# Patient Record
Sex: FEMALE | Race: BLACK OR AFRICAN AMERICAN | NOT HISPANIC OR LATINO | Employment: FULL TIME | URBAN - METROPOLITAN AREA
[De-identification: names, ages, dates, MRNs, and addresses within clinical notes are randomized per-mention and may not be internally consistent; named-entity substitution may affect disease eponyms.]

---

## 2017-01-24 ENCOUNTER — NON-PROVIDER VISIT (OUTPATIENT)
Dept: MEDICAL GROUP | Facility: MEDICAL CENTER | Age: 22
End: 2017-01-24
Payer: COMMERCIAL

## 2017-01-24 DIAGNOSIS — Z30.09 FAMILY PLANNING: ICD-10-CM

## 2017-01-24 PROCEDURE — 96372 THER/PROPH/DIAG INJ SC/IM: CPT | Performed by: NURSE PRACTITIONER

## 2017-01-24 RX ORDER — MEDROXYPROGESTERONE ACETATE 150 MG/ML
150 INJECTION, SUSPENSION INTRAMUSCULAR ONCE
Status: COMPLETED | OUTPATIENT
Start: 2017-01-24 | End: 2017-01-24

## 2017-01-24 RX ADMIN — MEDROXYPROGESTERONE ACETATE 150 MG: 150 INJECTION, SUSPENSION INTRAMUSCULAR at 14:24

## 2017-01-24 NOTE — MR AVS SNAPSHOT
Berenice Holden   2017 1:45 PM   Non-Provider Visit   MRN: [id number]    Department:  [facility]   Dept Phone:  [phone]    Description:  Female : 1995   Provider:  [facility]           Reason for Visit     Injections depo provera      Allergies as of 2017     No Known Allergies      You were diagnosed with     Family planning   [411051]         Vital Signs     Smoking Status                   Never Smoker            Basic Information     Date Of Birth Sex Race Ethnicity Preferred Language    1995 Female Black or  Non- English      Problem List              ICD-10-CM Priority Class Noted - Resolved    Family planning Z30.09   2016 - Present      Health Maintenance        Date Due Completion Dates    IMM VARICELLA (CHICKENPOX) VACCINE (1 of 2 - 2 Dose Adolescent Series) 2008 ---    IMM MENINGOCOCCAL VACCINE (MCV4) (1 of 1) 2011 ---    IMM HPV VACCINE (2 of 3 - Female 3 Dose Series) 3/30/2016 2/3/2016    IMM INFLUENZA (1) 2016 ---    PAP SMEAR 2016 ---    IMM DTaP/Tdap/Td Vaccine (2 - Td) 2018            Current Immunizations     HPV Quadrivalent Vaccine (GARDASIL) 2/3/2016  4:57 PM    Hepatitis A Vaccine, Ped/Adol 2006, 2001    Hepatitis B Vaccine Non-Recombivax (Ped/Adol) 2003, 2001, 2000, 2000    Tdap Vaccine 2008      Below and/or attached are the medications your provider expects you to take. Review all of your home medications and newly ordered medications with your provider and/or pharmacist. Follow medication instructions as directed by your provider and/or pharmacist. Please keep your medication list with you and share with your provider. Update the information when medications are discontinued, doses are changed, or new medications (including over-the-counter products) are added; and carry medication information at all times in the event of emergency situations     Allergies:  No Known Allergies          Medications  Valid as of: January 24, 2017 -  2:31 PM    Generic Name Brand Name Tablet Size Instructions for use    Norgestim-Eth Estrad Triphasic (Tab) Norgestim-Eth Estrad Triphasic 0.18/0.215/0.25 MG-35 MCG Take 1 Tab by mouth every day.        .                 Medicines prescribed today were sent to:     [facility][id number] - [place], [place] - [address]    [address] [place] [address]    Phone: [phone] Fax: [phone]    Open 24 Hours?: No      Medication refill instructions:       If your prescription bottle indicates you have medication refills left, it is not necessary to call your provider’s office. Please contact your pharmacy and they will refill your medication.    If your prescription bottle indicates you do not have any refills left, you may request refills at any time through one of the following ways: The online VetCloud system (except Urgent Care), by calling your provider’s office, or by asking your pharmacy to contact your provider’s office with a refill request. Medication refills are processed only during regular business hours and may not be available until the next business day. Your provider may request additional information or to have a follow-up visit with you prior to refilling your medication.   *Please Note: Medication refills are assigned a new Rx number when refilled electronically. Your pharmacy may indicate that no refills were authorized even though a new prescription for the same medication is available at the pharmacy. Please request the medicine by name with the pharmacy before contacting your provider for a refill.           VetCloud Access Code: [email or web address]  Expires: 2/23/2017  1:44 PM    VetCloud  A secure, online tool to manage your health information     [employer]’s VetCloud® is a secure, online tool that connects you to your personalized health information from the privacy of your home -- day or night - making it  very easy for you to manage your healthcare. Once the activation process is completed, you can even access your medical information using the DreamBox Learning josephine, which is available for free in the Apple Josephine store or Google Play store.     DreamBox Learning provides the following levels of access (as shown below):   My Chart Features   [facility] Doctor [facility]  Specialists [facility]  Urgent  Care Non-[facility]  Primary Care  Doctor   Email your healthcare team securely and privately 24/7 X X X    Manage appointments: schedule your next appointment; view details of past/upcoming appointments X      Request prescription refills. X      View recent personal medical records, including lab and immunizations X X X X   View health record, including health history, allergies, medications X X X X   Read reports about your outpatient visits, procedures, consult and ER notes X X X X   See your discharge summary, which is a recap of your hospital and/or ER visit that includes your diagnosis, lab results, and care plan. X X       How to register for DreamBox Learning:  1. Go to  [email or web address].  2. Click on the Sign Up Now box, which takes you to the New Member Sign Up page. You will need to provide the following information:  a. Enter your DreamBox Learning Access Code exactly as it appears at the top of this page. (You will not need to use this code after you’ve completed the sign-up process. If you do not sign up before the expiration date, you must request a new code.)   b. Enter your date of birth.   c. Enter your home email address.   d. Click Submit, and follow the next screen’s instructions.  3. Create a DreamBox Learning ID. This will be your DreamBox Learning login ID and cannot be changed, so think of one that is secure and easy to remember.  4. Create a DreamBox Learning password. You can change your password at any time.  5. Enter your Password Reset Question and Answer. This can be used at a later time if you forget your password.   6. Enter your e-mail address. This  allows you to receive e-mail notifications when new information is available in Genalyte.  7. Click Sign Up. You can now view your health information.    For assistance activating your Genalyte account, call [phone]

## 2017-01-24 NOTE — PROGRESS NOTES
Berenice Holden is a 21 y.o. here for a Depo Provera Injection.     Date of last Depo Provera Injection: 10-  Current date within therapeutic range?: Yes   Urine pregnancy test done (needed if out of date range): yes--negitive  Date of office visit:9-  Date of last pap (if > 21 years old)/ GYN exam: none on file  Dx: Contraceptive use    Patient tolerated injection and no adverse effects were observed or reported yes.    # of Administrations remaining in MAR:0  Next injection due between 4- and 4-.

## 2017-03-03 ENCOUNTER — OFFICE VISIT (OUTPATIENT)
Dept: URGENT CARE | Facility: CLINIC | Age: 22
End: 2017-03-03
Payer: COMMERCIAL

## 2017-03-03 VITALS
RESPIRATION RATE: 18 BRPM | DIASTOLIC BLOOD PRESSURE: 62 MMHG | WEIGHT: 118 LBS | BODY MASS INDEX: 21.58 KG/M2 | SYSTOLIC BLOOD PRESSURE: 106 MMHG | TEMPERATURE: 99.9 F | OXYGEN SATURATION: 97 % | HEART RATE: 112 BPM

## 2017-03-03 DIAGNOSIS — J02.0 STREP PHARYNGITIS: ICD-10-CM

## 2017-03-03 DIAGNOSIS — J02.9 SORE THROAT: ICD-10-CM

## 2017-03-03 LAB
INT CON NEG: NEGATIVE
INT CON POS: POSITIVE
S PYO AG THROAT QL: NORMAL

## 2017-03-03 PROCEDURE — 87880 STREP A ASSAY W/OPTIC: CPT | Performed by: PHYSICIAN ASSISTANT

## 2017-03-03 PROCEDURE — 99214 OFFICE O/P EST MOD 30 MIN: CPT | Performed by: PHYSICIAN ASSISTANT

## 2017-03-03 RX ORDER — AMOXICILLIN 875 MG/1
875 TABLET, COATED ORAL 2 TIMES DAILY
Qty: 20 TAB | Refills: 0 | Status: SHIPPED | OUTPATIENT
Start: 2017-03-03 | End: 2017-05-03

## 2017-03-03 ASSESSMENT — ENCOUNTER SYMPTOMS
HEADACHES: 0
DIARRHEA: 0
PALPITATIONS: 0
COUGH: 0
VOMITING: 0
NAUSEA: 0
WHEEZING: 0
FEVER: 1
SHORTNESS OF BREATH: 0
MYALGIAS: 1
CHILLS: 1
SWOLLEN GLANDS: 1
ABDOMINAL PAIN: 0
TROUBLE SWALLOWING: 1
SORE THROAT: 1

## 2017-03-03 NOTE — PROGRESS NOTES
Subjective:      Berenice Holden is a 21 y.o. female who presents with Pharyngitis            Pharyngitis   This is a new problem. The current episode started in the past 7 days. The problem has been gradually worsening. The pain is worse on the left side. The maximum temperature recorded prior to her arrival was 101 - 101.9 F. The fever has been present for 1 to 2 days. The pain is at a severity of 6/10. The pain is moderate. Associated symptoms include swollen glands and trouble swallowing. Pertinent negatives include no abdominal pain, congestion, coughing, diarrhea, ear pain, headaches, shortness of breath or vomiting. She has tried NSAIDs for the symptoms. The treatment provided mild relief.       PMH:  has no past medical history of Congestive heart failure, Cancer, Infectious disease, COPD, Diabetes, ASTHMA, CAD (coronary artery disease), Stroke, Seizure disorder, Hypertension, Renal disorder, Liver disease, or Psychiatric disorder.  MEDS:   Current outpatient prescriptions:   •  Norgestim-Eth Estrad Triphasic (ORTHO TRI-CYCLEN, 28,) 0.18/0.215/0.25 MG-35 MCG Tab, Take 1 Tab by mouth every day., Disp: 28 Tab, Rfl: 0  ALLERGIES: No Known Allergies  SURGHX: No past surgical history on file.  SOCHX:  reports that she has never smoked. She has never used smokeless tobacco. She reports that she drinks alcohol. She reports that she does not use illicit drugs.  FH: family history includes Arthritis in her maternal grandmother and paternal grandmother; Cancer in her maternal grandmother and paternal aunt; Diabetes in her maternal grandmother and paternal grandmother; Lung Disease in her father; Stroke in her maternal grandfather and maternal uncle.    Review of Systems   Constitutional: Positive for fever, chills and malaise/fatigue.   HENT: Positive for sore throat and trouble swallowing. Negative for congestion and ear pain.    Respiratory: Negative for cough, shortness of breath and wheezing.    Cardiovascular:  Negative for chest pain, palpitations and leg swelling.   Gastrointestinal: Negative for nausea, vomiting, abdominal pain and diarrhea.   Musculoskeletal: Positive for myalgias. Negative for joint pain.   Neurological: Negative for headaches.       Medications, Allergies, and current problem list reviewed today in Epic       Objective:     /62 mmHg  Pulse 112  Temp(Src) 37.7 °C (99.9 °F)  Resp 18  Wt 53.524 kg (118 lb)  SpO2 97%  LMP 02/03/2017  Breastfeeding? No     Physical Exam   Constitutional: She is oriented to person, place, and time. She appears well-developed and well-nourished. No distress.   HENT:   Head: Normocephalic and atraumatic.   Right Ear: Hearing, tympanic membrane and external ear normal.   Left Ear: Hearing, tympanic membrane and external ear normal.   Nose: Nose normal.   Mouth/Throat: Normal dentition. No dental caries. Oropharyngeal exudate, posterior oropharyngeal edema and posterior oropharyngeal erythema present.   Eyes: Conjunctivae and EOM are normal. Pupils are equal, round, and reactive to light. Right eye exhibits no discharge. Left eye exhibits no discharge. No scleral icterus.   Neck: Normal range of motion. Neck supple. No tracheal deviation present. No thyromegaly present.   Cardiovascular: Normal rate, regular rhythm and normal heart sounds.    Pulmonary/Chest: Effort normal and breath sounds normal. No respiratory distress. She has no wheezes.   Lymphadenopathy:        Head (right side): Submandibular adenopathy present.        Head (left side): Submandibular adenopathy present.     She has cervical adenopathy.        Right cervical: No superficial cervical adenopathy present.       Left cervical: No superficial cervical adenopathy present.   Neurological: She is alert and oriented to person, place, and time.   Skin: Skin is warm and dry. She is not diaphoretic. No cyanosis. Nails show no clubbing.   Psychiatric: She has a normal mood and affect. Her behavior is  normal. Judgment and thought content normal.   Nursing note and vitals reviewed.              Assessment/Plan:     1. Sore throat  POCT Rapid Strep A   2. Strep pharyngitis  amoxicillin (AMOXIL) 875 MG tablet     Strep negative however treating based on Centor criteria  Take full course of antibiotic  Increase fluids and rest  Advil or Tylenol for fever and pain  Warm beverages or Chloraseptic spray=  Note for work  Return to clinic or go to ED if symptoms worsen or persist. Indications for ED discussed at length. Patient voices understanding. Follow-up with your primary care provider in 3-5 days. Red flags discussed.    Please note that this dictation was created using voice recognition software. I have made every reasonable attempt to correct obvious errors, but I expect that there are errors of grammar and possibly content that I did not discover before finalizing the note.

## 2017-03-03 NOTE — Clinical Note
March 3, 2017         Patient: Berenice Holden   YOB: 1995   Date of Visit: 3/3/2017           To Whom it May Concern:    Berenice Holden was seen in my clinic on 3/3/2017. She may return to work on Sunday March 5th.    If you have any questions or concerns, please don't hesitate to call.        Sincerely,           Markus Bender PA-C  Electronically Signed

## 2017-03-03 NOTE — MR AVS SNAPSHOT
Berenice Holden   3/3/2017 8:00 AM   Office Visit   MRN: [id number]    Department:  [facility]   Dept Phone:  [phone]    Description:  Female : 1995   Provider:  Markus Bender PA-C           Reason for Visit     Pharyngitis           Allergies as of 3/3/2017     No Known Allergies      You were diagnosed with     Sore throat   [846840]       Strep pharyngitis   [[id number]]         Vital Signs     Blood Pressure Pulse Temperature Respirations Weight Oxygen Saturation    106/62 mmHg 112 37.7 °C (99.9 °F) 18 53.524 kg (118 lb) 97%    Last Menstrual Period Breastfeeding? Smoking Status             2017 No Never Smoker          Basic Information     Date Of Birth Sex Race Ethnicity Preferred Language    1995 Female Black or  Non- English      Problem List              ICD-10-CM Priority Class Noted - Resolved    Family planning Z30.09   2016 - Present      Health Maintenance        Date Due Completion Dates    IMM VARICELLA (CHICKENPOX) VACCINE (1 of 2 - 2 Dose Adolescent Series) 2008 ---    IMM MENINGOCOCCAL VACCINE (MCV4) (1 of 1) 2011 ---    IMM HPV VACCINE (2 of 3 - Female 3 Dose Series) 3/30/2016 2/3/2016    IMM INFLUENZA (1) 2016 ---    PAP SMEAR 2016 ---    IMM DTaP/Tdap/Td Vaccine (2 - Td) 2018            Results     POCT Rapid Strep A      Component    Rapid Strep Screen    NEG    Internal Control Positive    Positive    Internal Control Negative    Negative                        Current Immunizations     HPV Quadrivalent Vaccine (GARDASIL) 2/3/2016  4:57 PM    Hepatitis A Vaccine, Ped/Adol 2006, 2001    Hepatitis B Vaccine Non-Recombivax (Ped/Adol) 2003, 2001, 2000, 2000    Tdap Vaccine 2008      Below and/or attached are the medications your provider expects you to take. Review all of your home medications and newly ordered medications with your provider and/or pharmacist. Follow  medication instructions as directed by your provider and/or pharmacist. Please keep your medication list with you and share with your provider. Update the information when medications are discontinued, doses are changed, or new medications (including over-the-counter products) are added; and carry medication information at all times in the event of emergency situations     Allergies:  No Known Allergies          Medications  Valid as of: March 03, 2017 -  8:51 AM    Generic Name Brand Name Tablet Size Instructions for use    Amoxicillin (Tab) AMOXIL 875 MG Take 1 Tab by mouth 2 times a day.        Norgestim-Eth Estrad Triphasic (Tab) Norgestim-Eth Estrad Triphasic 0.18/0.215/0.25 MG-35 MCG Take 1 Tab by mouth every day.        .                 Medicines prescribed today were sent to:     [facility]/PH[facility]ACY #[id number] - [place] - [address]    [address] [address]    Phone: [phone] Fax: [phone]    Open 24 Hours?: No      Medication refill instructions:       If your prescription bottle indicates you have medication refills left, it is not necessary to call your provider’s office. Please contact your pharmacy and they will refill your medication.    If your prescription bottle indicates you do not have any refills left, you may request refills at any time through one of the following ways: The online Easy Taxi system (except Urgent Care), by calling your provider’s office, or by asking your pharmacy to contact your provider’s office with a refill request. Medication refills are processed only during regular business hours and may not be available until the next business day. Your provider may request additional information or to have a follow-up visit with you prior to refilling your medication.   *Please Note: Medication refills are assigned a new Rx number when refilled electronically. Your pharmacy may indicate that no refills were authorized even though a new prescription for the same medication  is available at the pharmacy. Please request the medicine by name with the pharmacy before contacting your provider for a refill.           Qranio Access Code: [email or web address]  Expires: 4/2/2017  8:51 AM    Qranio  A secure, online tool to manage your health information     [employer]’s Qranio® is a secure, online tool that connects you to your personalized health information from the privacy of your home -- day or night - making it very easy for you to manage your healthcare. Once the activation process is completed, you can even access your medical information using the Qranio josephine, which is available for free in the Apple Josephine store or Google Play store.     Qranio provides the following levels of access (as shown below):   My Chart Features   [facility] Primary [facility] Doctor [facility]  Specialists [facility]  Urgent  Care Non-[facility]  Primary Care  Doctor   Email your healthcare team securely and privately 24/7 X X X    Manage appointments: schedule your next appointment; view details of past/upcoming appointments X      Request prescription refills. X      View recent personal medical records, including lab and immunizations X X X X   View health record, including health history, allergies, medications X X X X   Read reports about your outpatient visits, procedures, consult and ER notes X X X X   See your discharge summary, which is a recap of your hospital and/or ER visit that includes your diagnosis, lab results, and care plan. X X       How to register for Qranio:  1. Go to  [email or web address].  2. Click on the Sign Up Now box, which takes you to the New Member Sign Up page. You will need to provide the following information:  a. Enter your Qranio Access Code exactly as it appears at the top of this page. (You will not need to use this code after you’ve completed the sign-up process. If you do not sign up before the expiration date, you must request a new code.)   b. Enter your date of birth.   c. Enter  your home email address.   d. Click Submit, and follow the next screen’s instructions.  3. Create a Tracourt ID. This will be your allyDVM login ID and cannot be changed, so think of one that is secure and easy to remember.  4. Create a Tracourt password. You can change your password at any time.  5. Enter your Password Reset Question and Answer. This can be used at a later time if you forget your password.   6. Enter your e-mail address. This allows you to receive e-mail notifications when new information is available in allyDVM.  7. Click Sign Up. You can now view your health information.    For assistance activating your allyDVM account, call [phone]

## 2017-04-04 ENCOUNTER — OFFICE VISIT (OUTPATIENT)
Dept: URGENT CARE | Facility: CLINIC | Age: 22
End: 2017-04-04
Payer: COMMERCIAL

## 2017-04-04 VITALS
RESPIRATION RATE: 18 BRPM | DIASTOLIC BLOOD PRESSURE: 68 MMHG | HEART RATE: 88 BPM | TEMPERATURE: 97.8 F | OXYGEN SATURATION: 97 % | SYSTOLIC BLOOD PRESSURE: 110 MMHG
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
## 2017-04-04 DIAGNOSIS — L03.114 CELLULITIS OF LEFT UPPER EXTREMITY: ICD-10-CM

## 2017-04-04 DIAGNOSIS — L02.414 ABSCESS OF FOREARM, LEFT: ICD-10-CM

## 2017-04-04 PROCEDURE — 99214 OFFICE O/P EST MOD 30 MIN: CPT | Performed by: FAMILY MEDICINE

## 2017-04-04 RX ORDER — SULFAMETHOXAZOLE AND TRIMETHOPRIM 800; 160 MG/1; MG/1
2 TABLET ORAL 2 TIMES DAILY
Qty: 20 TAB | Refills: 0 | Status: SHIPPED | OUTPATIENT
Start: 2017-04-04 | End: 2017-04-09

## 2017-04-04 ASSESSMENT — ENCOUNTER SYMPTOMS
CHILLS: 0
NAUSEA: 0
VOMITING: 0
FEVER: 0
ANOREXIA: 0

## 2017-04-04 NOTE — MR AVS SNAPSHOT
Berenice Holden   2017 6:15 PM   Office Visit   MRN: [id number]    Department:  [facility]   Dept Phone:  [phone]    Description:  Female : 1995   Provider:  Mazin Alvares M.D.           Reason for Visit     Bug Bite Couple days left forearm bug bite , swollen and sore      Allergies as of 2017     No Known Allergies      You were diagnosed with     Infected insect bite, initial encounter   [7225612]         Vital Signs     Blood Pressure Pulse Temperature Respirations Oxygen Saturation Smoking Status    110/68 mmHg 88 36.6 °C (97.8 °F) 18 97% Never Smoker       Basic Information     Date Of Birth Sex Race Ethnicity Preferred Language    1995 Female Black or  Non- English      Problem List              ICD-10-CM Priority Class Noted - Resolved    Family planning Z30.09   2016 - Present      Health Maintenance        Date Due Completion Dates    IMM VARICELLA (CHICKENPOX) VACCINE (1 of 2 - 2 Dose Adolescent Series) 2008 ---    IMM MENINGOCOCCAL VACCINE (MCV4) (1 of 1) 2011 ---    IMM HPV VACCINE (2 of 3 - Female 3 Dose Series) 3/30/2016 2/3/2016    PAP SMEAR 2016 ---    IMM DTaP/Tdap/Td Vaccine (2 - Td) 2018            Current Immunizations     HPV Quadrivalent Vaccine (GARDASIL) 2/3/2016  4:57 PM    Hepatitis A Vaccine, Ped/Adol 2006, 2001    Hepatitis B Vaccine Non-Recombivax (Ped/Adol) 2003, 2001, 2000, 2000    Tdap Vaccine 2008      Below and/or attached are the medications your provider expects you to take. Review all of your home medications and newly ordered medications with your provider and/or pharmacist. Follow medication instructions as directed by your provider and/or pharmacist. Please keep your medication list with you and share with your provider. Update the information when medications are discontinued, doses are changed, or new medications (including over-the-counter  products) are added; and carry medication information at all times in the event of emergency situations     Allergies:  No Known Allergies          Medications  Valid as of: April 04, 2017 -  6:36 PM    Generic Name Brand Name Tablet Size Instructions for use    Amoxicillin (Tab) AMOXIL 875 MG Take 1 Tab by mouth 2 times a day.        Norgestim-Eth Estrad Triphasic (Tab) Norgestim-Eth Estrad Triphasic 0.18/0.215/0.25 MG-35 MCG Take 1 Tab by mouth every day.        Sulfamethoxazole-Trimethoprim (Tab) BACTRIM -160 MG Take 2 Tabs by mouth 2 times a day for 5 days.        .                 Medicines prescribed today were sent to:     [facility]/PHARMACY #[id number] - [place], [place] - [address]    [address] [place] [address]    Phone: [phone] Fax: [phone]    Open 24 Hours?: No      Medication refill instructions:       If your prescription bottle indicates you have medication refills left, it is not necessary to call your provider’s office. Please contact your pharmacy and they will refill your medication.    If your prescription bottle indicates you do not have any refills left, you may request refills at any time through one of the following ways: The online [employer] system (except Urgent Care), by calling your provider’s office, or by asking your pharmacy to contact your provider’s office with a refill request. Medication refills are processed only during regular business hours and may not be available until the next business day. Your provider may request additional information or to have a follow-up visit with you prior to refilling your medication.   *Please Note: Medication refills are assigned a new Rx number when refilled electronically. Your pharmacy may indicate that no refills were authorized even though a new prescription for the same medication is available at the pharmacy. Please request the medicine by name with the pharmacy before contacting your provider for a refill.           Yamli  Code: [email or web address]  Expires: 5/4/2017  6:36 PM    [facility]  A secure, online tool to manage your health information     [employer]’s [facility]® is a secure, online tool that connects you to your personalized health information from the privacy of your home -- day or night - making it very easy for you to manage your healthcare. Once the activation process is completed, you can even access your medical information using the [facility] josephine, which is available for free in the Apple Josephine store or Google Play store.     [facility] provides the following levels of access (as shown below):   My Chart Features   [facility] Primary Care Doctor [facility]  Specialists [facility]  Urgent  Care Non-[facility]  Primary Care  Doctor   Email your healthcare team securely and privately 24/7 X X X    Manage appointments: schedule your next appointment; view details of past/upcoming appointments X      Request prescription refills. X      View recent personal medical records, including lab and immunizations X X X X   View health record, including health history, allergies, medications X X X X   Read reports about your outpatient visits, procedures, consult and ER notes X X X X   See your discharge summary, which is a recap of your hospital and/or ER visit that includes your diagnosis, lab results, and care plan. X X       How to register for [facility]:  1. Go to  [email or web address].  2. Click on the Sign Up Now box, which takes you to the New Member Sign Up page. You will need to provide the following information:  a. Enter your [facility] Access Code exactly as it appears at the top of this page. (You will not need to use this code after you’ve completed the sign-up process. If you do not sign up before the expiration date, you must request a new code.)   b. Enter your date of birth.   c. Enter your home email address.   d. Click Submit, and follow the next screen’s instructions.  3. Create a [facility] ID. This will be your [facility] login ID and  cannot be changed, so think of one that is secure and easy to remember.  4. Create a [employer] [place] password. You can change your password at any time.  5. Enter your Password Reset Question and Answer. This can be used at a later time if you forget your password.   6. Enter your e-mail address. This allows you to receive e-mail notifications when new information is available in [employer] [place].  7. Click Sign Up. You can now view your health information.    For assistance activating your [employer] [place] account, call [phone]

## 2017-04-05 NOTE — PROGRESS NOTES
Subjective:      Berenice Holden is a 21 y.o. female who presents with Bug Bite  infected insect bite          Other  This is a new problem. The current episode started in the past 7 days (5 days). The problem occurs constantly. The problem has been gradually worsening. Pertinent negatives include no anorexia, chills, fever, nausea, rash or vomiting. Exacerbated by: pressure on the area. She has tried NSAIDs (benadryl) for the symptoms.       Review of Systems   Constitutional: Negative for fever and chills.   Gastrointestinal: Negative for nausea, vomiting and anorexia.   Skin: Negative for rash.     PMH:  has no past medical history of Congestive heart failure (CMS-[facility]), Cancer (CMS-[facility]ly [facility]), Infectious disease, COPD, Diabetes, ASTHMA, CAD (coronary artery disease), Stroke (CMS-Formerly [facility]), Seizure disorder (CMS-Formerly [facility]), Hypertension, Renal disorder, Liver disease, or Psychiatric disorder.  MEDS:   Current outpatient prescriptions:   •  Norgestim-Eth Estrad Triphasic (ORTHO TRI-CYCLEN, 28,) 0.18/0.215/0.25 MG-35 MCG Tab, Take 1 Tab by mouth every day., Disp: 28 Tab, Rfl: 0  •  amoxicillin (AMOXIL) 875 MG tablet, Take 1 Tab by mouth 2 times a day., Disp: 20 Tab, Rfl: 0  ALLERGIES: No Known Allergies  SURGHX: History reviewed. No pertinent past surgical history.  SOCHX:  reports that she has never smoked. She has never used smokeless tobacco. She reports that she drinks alcohol. She reports that she does not use illicit drugs.  FH: Family history was reviewed, no pertinent findings to report       Objective:     /68 mmHg  Pulse 88  Temp(Src) 36.6 °C (97.8 °F)  Resp 18  SpO2 97%     Physical Exam   Constitutional: She appears well-developed. No distress.   Pulmonary/Chest: Effort normal.   Skin: Skin is warm and dry. Rash noted. Rash is pustular. She is not diaphoretic. There is erythema.        Psychiatric: She has a normal mood and affect. Her behavior is normal.               Assessment/Plan:     1. Infected insect  bite, initial encounter  sulfamethoxazole-trimethoprim (BACTRIM DS) 800-160 MG tablet     Supportive care  Push fluids  Monitor temperature  Follow-up if symptoms worsen or fail to improve    Patient was given a contingent antibiotic prescription to fill and use as directed if symptoms progressed as discussed and agreed upon.      PROCEDURE NOTE:  Risks and benefits discussed  Informed consent obtained  Area cleaned with alcohol  18-gauge needle used to perforate pustule  Pressure applied and purulent material extracted from pustule, approximately 6 mm diameter  Patient tolerated the procedure well  Area covered with triple antibiotic ointment and Band-Aid

## 2017-04-12 ENCOUNTER — NON-PROVIDER VISIT (OUTPATIENT)
Dept: MEDICAL GROUP | Facility: MEDICAL CENTER | Age: 22
End: 2017-04-12
Payer: COMMERCIAL

## 2017-04-12 DIAGNOSIS — Z30.09 FAMILY PLANNING: ICD-10-CM

## 2017-04-12 PROCEDURE — 96372 THER/PROPH/DIAG INJ SC/IM: CPT | Performed by: NURSE PRACTITIONER

## 2017-04-12 RX ORDER — MEDROXYPROGESTERONE ACETATE 150 MG/ML
150 INJECTION, SUSPENSION INTRAMUSCULAR ONCE
Status: COMPLETED | OUTPATIENT
Start: 2017-04-12 | End: 2017-04-12

## 2017-04-12 RX ADMIN — MEDROXYPROGESTERONE ACETATE 150 MG: 150 INJECTION, SUSPENSION INTRAMUSCULAR at 14:07

## 2017-04-12 NOTE — PROGRESS NOTES
Berenice Holden is a 21 y.o. female here for a non-provider visit for depo-provera injection.    Reason for injection: family planning/birthcontrol  Order in MAR?: Yes  Patient supplied?:No  Minimum interval has been met for this injection (per MAR order): Yes    Order and dose verified by:TITO/ Isatu solano  Patient tolerated injection and no adverse effects were observed or reported yes.    # of Administrations remaining in MAR:0    Pt informed she needed an apt for future injection-pt has apt in May

## 2017-04-12 NOTE — MR AVS SNAPSHOT
Berenice Holden   2017 1:15 PM   Non-Provider Visit   MRN: [id number]    Department:  [facility]   Dept Phone:  [phone]    Description:  Female : 1995   Provider:  SOUTH TEJEDA MA           Reason for Visit     Injections depo provera      Allergies as of 2017     No Known Allergies      You were diagnosed with     Family planning   [[id number]]         Vital Signs     Smoking Status                   Never Smoker            Basic Information     Date Of Birth Sex Race Ethnicity Preferred Language    1995 Female Black or  Non- English      Your appointments     May 03, 2017  5:00 PM   ANNUAL EXAM PREVENTATIVE with ROLANDO Hutchinson   [facility] ([facility])    [address]  [place] [id number] [phone]              Problem List              ICD-10-CM Priority Class Noted - Resolved    Family planning Z30.09   2016 - Present      Health Maintenance        Date Due Completion Dates    IMM VARICELLA (CHICKENPOX) VACCINE (1 of 2 - 2 Dose Adolescent Series) 2008 ---    IMM MENINGOCOCCAL VACCINE (MCV4) (1 of 1) 2011 ---    IMM HPV VACCINE (2 of 3 - Female 3 Dose Series) 3/30/2016 2/3/2016    PAP SMEAR 2016 ---    IMM DTaP/Tdap/Td Vaccine (2 - Td) 2018            Current Immunizations     HPV Quadrivalent Vaccine (GARDASIL) 2/3/2016  4:57 PM    Hepatitis A Vaccine, Ped/Adol 2006, 2001    Hepatitis B Vaccine Non-Recombivax (Ped/Adol) 2003, 2001, 2000, 2000    Tdap Vaccine 2008      Below and/or attached are the medications your provider expects you to take. Review all of your home medications and newly ordered medications with your provider and/or pharmacist. Follow medication instructions as directed by your provider and/or pharmacist. Please keep your medication list with you and share with your provider. Update the information when  medications are discontinued, doses are changed, or new medications (including over-the-counter products) are added; and carry medication information at all times in the event of emergency situations     Allergies:  No Known Allergies          Medications  Valid as of: April 12, 2017 -  3:11 PM    Generic Name Brand Name Tablet Size Instructions for use    Amoxicillin (Tab) AMOXIL 875 MG Take 1 Tab by mouth 2 times a day.        Norgestim-Eth Estrad Triphasic (Tab) Norgestim-Eth Estrad Triphasic 0.18/0.215/0.25 MG-35 MCG Take 1 Tab by mouth every day.        .                 Medicines prescribed today were sent to:     [facility]/PHARMACY #[id number] - [place], [place] - [address]    [address] [facility] [place] [address]    Phone: [phone] Fax: [phone]    Open 24 Hours?: No      Medication refill instructions:       If your prescription bottle indicates you have medication refills left, it is not necessary to call your provider’s office. Please contact your pharmacy and they will refill your medication.    If your prescription bottle indicates you do not have any refills left, you may request refills at any time through one of the following ways: The online [employer] system (except Urgent Care), by calling your provider’s office, or by asking your pharmacy to contact your provider’s office with a refill request. Medication refills are processed only during regular business hours and may not be available until the next business day. Your provider may request additional information or to have a follow-up visit with you prior to refilling your medication.   *Please Note: Medication refills are assigned a new Rx number when refilled electronically. Your pharmacy may indicate that no refills were authorized even though a new prescription for the same medication is available at the pharmacy. Please request the medicine by name with the pharmacy before contacting your provider for a refill.           BAAS[facility] Access Code:  [email or web address]  Expires: 5/4/2017  6:36 PM    [employer]  A secure, online tool to manage your health information     [employer]’s [employer]® is a secure, online tool that connects you to your personalized health information from the privacy of your home -- day or night - making it very easy for you to manage your healthcare. Once the activation process is completed, you can even access your medical information using the [employer] josephine, which is available for free in the Apple Josephine store or Google Play store.     [employer] provides the following levels of access (as shown below):   My Chart Features   [facility] Primary Care Doctor [facility]  Specialists [facility]  Urgent  Care Non-[facility]  Primary Care  Doctor   Email your healthcare team securely and privately 24/7 X X X    Manage appointments: schedule your next appointment; view details of past/upcoming appointments X      Request prescription refills. X      View recent personal medical records, including lab and immunizations X X X X   View health record, including health history, allergies, medications X X X X   Read reports about your outpatient visits, procedures, consult and ER notes X X X X   See your discharge summary, which is a recap of your hospital and/or ER visit that includes your diagnosis, lab results, and care plan. X X       How to register for [employer]:  1. Go to  [email or web address].  2. Click on the Sign Up Now box, which takes you to the New Member Sign Up page. You will need to provide the following information:  a. Enter your [employer] Access Code exactly as it appears at the top of this page. (You will not need to use this code after you’ve completed the sign-up process. If you do not sign up before the expiration date, you must request a new code.)   b. Enter your date of birth.   c. Enter your home email address.   d. Click Submit, and follow the next screen’s instructions.  3. Create a [employer] ID. This will be your [employer] login ID and cannot be  changed, so think of one that is secure and easy to remember.  4. Create a [employer] password. You can change your password at any time.  5. Enter your Password Reset Question and Answer. This can be used at a later time if you forget your password.   6. Enter your e-mail address. This allows you to receive e-mail notifications when new information is available in [employer].  7. Click Sign Up. You can now view your health information.    For assistance activating your [employer] account, call [phone]

## 2017-04-21 ENCOUNTER — OFFICE VISIT (OUTPATIENT)
Dept: URGENT CARE | Facility: CLINIC | Age: 22
End: 2017-04-21
Payer: COMMERCIAL

## 2017-04-21 VITALS
HEIGHT: 63 IN | TEMPERATURE: 99.6 F | SYSTOLIC BLOOD PRESSURE: 110 MMHG | WEIGHT: 118 LBS | HEART RATE: 95 BPM | DIASTOLIC BLOOD PRESSURE: 80 MMHG | BODY MASS INDEX: 20.91 KG/M2 | OXYGEN SATURATION: 99 % | RESPIRATION RATE: 16 BRPM

## 2017-04-21 DIAGNOSIS — L30.9 DERMATITIS: ICD-10-CM

## 2017-04-21 PROCEDURE — 99214 OFFICE O/P EST MOD 30 MIN: CPT | Performed by: NURSE PRACTITIONER

## 2017-04-21 RX ORDER — PREDNISONE 10 MG/1
TABLET ORAL
Qty: 15 TAB | Refills: 0 | Status: SHIPPED | OUTPATIENT
Start: 2017-04-21 | End: 2017-05-03

## 2017-04-21 ASSESSMENT — ENCOUNTER SYMPTOMS
CHILLS: 0
FEVER: 0
COUGH: 0
SHORTNESS OF BREATH: 0
SORE THROAT: 0
NEUROLOGICAL NEGATIVE: 1
DIZZINESS: 0
HEADACHES: 0

## 2017-04-21 NOTE — MR AVS SNAPSHOT
"        Berenice Holden   2017 5:30 PM   Office Visit   MRN: [id number]    Department:  [facility]   Dept Phone:  [phone]    Description:  Female : 1995   Provider:  Cathey J Hamman, A.P.N.           Reason for Visit     Rash face and arms, lower back, chest, down the legs, started this morning, just completed bactrim ds this Sun.      Allergies as of 2017     No Known Allergies      You were diagnosed with     Dermatitis   [[id number]]         Vital Signs     Blood Pressure Pulse Temperature Respirations Height Weight    110/80 mmHg 95 37.6 °C (99.6 °F) 16 1.6 m (5' 3\") 53.524 kg (118 lb)    Body Mass Index Oxygen Saturation Smoking Status             20.91 kg/m2 99% Never Smoker          Basic Information     Date Of Birth Sex Race Ethnicity Preferred Language    1995 Female Black or  Non- English      Your appointments     May 03, 2017  5:00 PM   ANNUAL EXAM PREVENTATIVE with ROLANDO Hutchinson   [facility])    [address]  [facility] [address]   [phone]              Problem List              ICD-10-CM Priority Class Noted - Resolved    Family planning Z30.09   2016 - Present      Health Maintenance        Date Due Completion Dates    IMM VARICELLA (CHICKENPOX) VACCINE (1 of 2 - 2 Dose Adolescent Series) 2008 ---    IMM MENINGOCOCCAL VACCINE (MCV4) (1 of 1) 2011 ---    IMM HPV VACCINE (2 of 3 - Female 3 Dose Series) 3/30/2016 2/3/2016    PAP SMEAR 2016 ---    IMM DTaP/Tdap/Td Vaccine (2 - Td) 2018            Current Immunizations     HPV Quadrivalent Vaccine (GARDASIL) 2/3/2016  4:57 PM    Hepatitis A Vaccine, Ped/Adol 2006, 2001    Hepatitis B Vaccine Non-Recombivax (Ped/Adol) 2003, 2001, 2000, 2000    Tdap Vaccine 2008      Below and/or attached are the medications your provider expects you to take. Review all of your home " medications and newly ordered medications with your provider and/or pharmacist. Follow medication instructions as directed by your provider and/or pharmacist. Please keep your medication list with you and share with your provider. Update the information when medications are discontinued, doses are changed, or new medications (including over-the-counter products) are added; and carry medication information at all times in the event of emergency situations     Allergies:  No Known Allergies          Medications  Valid as of: April 21, 2017 -  6:02 PM    Generic Name Brand Name Tablet Size Instructions for use    Amoxicillin (Tab) AMOXIL 875 MG Take 1 Tab by mouth 2 times a day.        Norgestim-Eth Estrad Triphasic (Tab) Norgestim-Eth Estrad Triphasic 0.18/0.215/0.25 MG-35 MCG Take 1 Tab by mouth every day.        PredniSONE (Tab) DELTASONE 10 MG Take 1 tablet 3 times daily for 5 days.        .                 Medicines prescribed today were sent to:     [facility]/PHARMACY #[id number] - [place], [place] - [address]    [address] [address]    Phone: [phone] Fax: [phone]    Open 24 Hours?: No      Medication refill instructions:       If your prescription bottle indicates you have medication refills left, it is not necessary to call your provider’s office. Please contact your pharmacy and they will refill your medication.    If your prescription bottle indicates you do not have any refills left, you may request refills at any time through one of the following ways: The online [employer] system (except Urgent Care), by calling your provider’s office, or by asking your pharmacy to contact your provider’s office with a refill request. Medication refills are processed only during regular business hours and may not be available until the next business day. Your provider may request additional information or to have a follow-up visit with you prior to refilling your medication.   *Please Note: Medication refills are  assigned a new Rx number when refilled electronically. Your pharmacy may indicate that no refills were authorized even though a new prescription for the same medication is available at the pharmacy. Please request the medicine by name with the pharmacy before contacting your provider for a refill.           [email or web address] Access Code: [email or web address]  Expires: 5/4/2017  6:36 PM    [email or web address]  A secure, online tool to manage your health information     [employer]’s [email or web address]® is a secure, online tool that connects you to your personalized health information from the privacy of your home -- day or night - making it very easy for you to manage your healthcare. Once the activation process is completed, you can even access your medical information using the [email or web address].com josephine, which is available for free in the Apple Josephine store or Google Play store.     [email or web address] provides the following levels of access (as shown below):   My Chart Features   [facility] Primary Care Doctor [facility]  Specialists [facility]  Urgent  Care Non-[facility]  Primary Care  Doctor   Email your healthcare team securely and privately 24/7 X X X    Manage appointments: schedule your next appointment; view details of past/upcoming appointments X      Request prescription refills. X      View recent personal medical records, including lab and immunizations X X X X   View health record, including health history, allergies, medications X X X X   Read reports about your outpatient visits, procedures, consult and ER notes X X X X   See your discharge summary, which is a recap of your hospital and/or ER visit that includes your diagnosis, lab results, and care plan. X X       How to register for [email or web address]:  1. Go to  [email or web address].  2. Click on the Sign Up Now box, which takes you to the New Member Sign Up page. You will need to provide the following information:  a. Enter your [email or web address] Access Code exactly as it appears at the top of this page. (You will not need to use this  code after you’ve completed the sign-up process. If you do not sign up before the expiration date, you must request a new code.)   b. Enter your date of birth.   c. Enter your home email address.   d. Click Submit, and follow the next screen’s instructions.  3. Create a [employer] ID. This will be your [employer] login ID and cannot be changed, so think of one that is secure and easy to remember.  4. Create a [employer] password. You can change your password at any time.  5. Enter your Password Reset Question and Answer. This can be used at a later time if you forget your password.   6. Enter your e-mail address. This allows you to receive e-mail notifications when new information is available in [employer].  7. Click Sign Up. You can now view your health information.    For assistance activating your [employer] account, call [phone]

## 2017-04-22 NOTE — PROGRESS NOTES
Subjective:      Berenice Holden is a 21 y.o. female who presents with Rash    PMH: no history of chronic illness    Social History     Social History   • Marital Status: Single     Spouse Name: N/A   • Number of Children: N/A   • Years of Education: N/A     Occupational History   • Not on file.     Social History Main Topics   • Smoking status: Never Smoker    • Smokeless tobacco: Never Used   • Alcohol Use: 0.0 oz/week     0 Standard drinks or equivalent per week      Comment: occasional   • Drug Use: No   • Sexual Activity:     Partners: Male     Other Topics Concern   • Not on file     Social History Narrative     Family History   Problem Relation Age of Onset   • Lung Disease Father    • Stroke Maternal Uncle    • Cancer Paternal Aunt      breast   • Arthritis Maternal Grandmother    • Diabetes Maternal Grandmother    • Cancer Maternal Grandmother      thyroid   • Stroke Maternal Grandfather    • Arthritis Paternal Grandmother    • Diabetes Paternal Grandmother      This patient is a 21-year-old female who presents today with complaint of generalized rash. Patient completed a course of Bactrim double strength 5 days ago. She otherwise knows of no exposure to any new soaps, lotions, and has not taken any other medications. Patient states rash is pruritic. She denies any tightness in her throat or chest, she denies any difficulty breathing.          Rash  This is a new problem. The current episode started today. The problem has been gradually worsening since onset. The rash is diffuse. The rash is characterized by redness, swelling and itchiness. It is unknown if there was an exposure to a precipitant. Associated symptoms include facial edema. Pertinent negatives include no congestion, cough, fever, joint pain, shortness of breath or sore throat. Treatments tried: Benadryl. The treatment provided no relief.       Review of Systems   Constitutional: Negative for fever and chills.   HENT: Negative for congestion and  "sore throat.    Respiratory: Negative for cough and shortness of breath.    Cardiovascular: Negative for chest pain.   Musculoskeletal: Negative for joint pain.   Skin: Positive for itching and rash.   Neurological: Negative.  Negative for dizziness and headaches.     Allergies: Review of patient's allergies indicates no known allergies.       Objective:     /80 mmHg  Pulse 95  Temp(Src) 37.6 °C (99.6 °F)  Resp 16  Ht 1.6 m (5' 3\")  Wt 53.524 kg (118 lb)  BMI 20.91 kg/m2  SpO2 99%     Physical Exam   Constitutional: She is oriented to person, place, and time. She appears well-developed and well-nourished. No distress.   HENT:   Nose: Nose normal.   Mouth/Throat: Oropharynx is clear and moist. No oropharyngeal exudate.   No  oropharyngeal edema noted   Eyes: Conjunctivae and EOM are normal. Pupils are equal, round, and reactive to light.   Neck: Normal range of motion. Neck supple.   Cardiovascular: Normal rate and regular rhythm.    Pulmonary/Chest: Effort normal and breath sounds normal.   Musculoskeletal: Normal range of motion.   Neurological: She is alert and oriented to person, place, and time.   Skin: Skin is warm and dry. Rash noted. She is not diaphoretic.   There is a generalized fine red papular rash to the upper extremities, abdomen and trunk, and thighs.   Psychiatric: She has a normal mood and affect. Her behavior is normal.   Vitals reviewed.              Assessment/Plan:     1. Dermatitis    - predniSONE (DELTASONE) 10 MG Tab; Take 1 tablet 3 times daily for 5 days.  Dispense: 15 Tab; Refill: 0  -benadryl/claritin PRN  -follow up if symptoms persist or worsen      "

## 2017-04-27 ENCOUNTER — OFFICE VISIT (OUTPATIENT)
Dept: URGENT CARE | Facility: CLINIC | Age: 22
End: 2017-04-27
Payer: COMMERCIAL

## 2017-04-27 VITALS
RESPIRATION RATE: 16 BRPM | HEIGHT: 63 IN | SYSTOLIC BLOOD PRESSURE: 100 MMHG | WEIGHT: 118 LBS | DIASTOLIC BLOOD PRESSURE: 82 MMHG | TEMPERATURE: 98.2 F | OXYGEN SATURATION: 99 % | BODY MASS INDEX: 20.91 KG/M2 | HEART RATE: 77 BPM
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
## 2017-04-27 PROCEDURE — 99213 OFFICE O/P EST LOW 20 MIN: CPT | Mod: 25 | Performed by: FAMILY MEDICINE

## 2017-04-27 PROCEDURE — 90715 TDAP VACCINE 7 YRS/> IM: CPT | Performed by: FAMILY MEDICINE

## 2017-04-27 PROCEDURE — 90471 IMMUNIZATION ADMIN: CPT | Performed by: FAMILY MEDICINE

## 2017-04-27 RX ORDER — AMOXICILLIN AND CLAVULANATE POTASSIUM 875; 125 MG/1; MG/1
1 TABLET, FILM COATED ORAL 2 TIMES DAILY
Qty: 20 TAB | Refills: 0 | Status: SHIPPED | OUTPATIENT
Start: 2017-04-27 | End: 2017-05-07

## 2017-04-27 ASSESSMENT — ENCOUNTER SYMPTOMS
NAUSEA: 0
CHILLS: 0
FEVER: 0
SHORTNESS OF BREATH: 0

## 2017-04-27 NOTE — MR AVS SNAPSHOT
"        Berenice Holden   2017 6:15 PM   Office Visit   MRN: [id number]    Department:  [facility]   Dept Phone:  [phone]    Description:  Female : 1995   Provider:  Mazin Alvares M.D.           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 dog running around       Allergies as of 2017     No Known Allergies      You were diagnosed with     Dog bite, initial encounter   [[id number]]         Vital Signs     Blood Pressure Pulse Temperature Respirations Height Weight    100/82 mmHg 77 36.8 °C (98.2 °F) 16 1.6 m (5' 3\") 53.524 kg (118 lb)    Body Mass Index Oxygen Saturation Smoking Status             20.91 kg/m2 99% Never Smoker          Basic Information     Date Of Birth Sex Race Ethnicity Preferred Language    1995 Female Black or  Non- English      Your appointments     May 03, 2017  5:00 PM   ANNUAL EXAM PREVENTATIVE with ROLANDO Hutchinson   [facility])    [address]  [facility] [address]   [phone]              Problem List              ICD-10-CM Priority Class Noted - Resolved    Family planning Z30.09   2016 - Present      Health Maintenance        Date Due Completion Dates    IMM VARICELLA (CHICKENPOX) VACCINE (1 of 2 - 2 Dose Adolescent Series) 2008 ---    IMM MENINGOCOCCAL VACCINE (MCV4) (1 of 1) 2011 ---    IMM HPV VACCINE (2 of 3 - Female 3 Dose Series) 3/30/2016 2/3/2016    PAP SMEAR 2016 ---    IMM DTaP/Tdap/Td Vaccine (2 - Td) 2018            Current Immunizations     HPV Quadrivalent Vaccine (GARDASIL) 2/3/2016  4:57 PM    Hepatitis A Vaccine, Ped/Adol 2006, 2001    Hepatitis B Vaccine Non-Recombivax (Ped/Adol) 2003, 2001, 2000, 2000    Tdap Vaccine 2017  6:36 PM, 2008      Below and/or attached are the medications your provider expects " you to take. Review all of your home medications and newly ordered medications with your provider and/or pharmacist. Follow medication instructions as directed by your provider and/or pharmacist. Please keep your medication list with you and share with your provider. Update the information when medications are discontinued, doses are changed, or new medications (including over-the-counter products) are added; and carry medication information at all times in the event of emergency situations     Allergies:  No Known Allergies          Medications  Valid as of: April 27, 2017 -  7:22 PM    Generic Name Brand Name Tablet Size Instructions for use    Amoxicillin (Tab) AMOXIL 875 MG Take 1 Tab by mouth 2 times a day.        Amoxicillin-Pot Clavulanate (Tab) AUGMENTIN 875-125 MG Take 1 Tab by mouth 2 times a day for 10 days.        Norgestim-Eth Estrad Triphasic (Tab) Norgestim-Eth Estrad Triphasic 0.18/0.215/0.25 MG-35 MCG Take 1 Tab by mouth every day.        PredniSONE (Tab) DELTASONE 10 MG Take 1 tablet 3 times daily for 5 days.        .                 Medicines prescribed today were sent to:     [id number] [place], [place] - [address]    [address] [place] [address]    Phone: [phone] Fax: [phone]    Open 24 Hours?: No      Medication refill instructions:       If your prescription bottle indicates you have medication refills left, it is not necessary to call your provider’s office. Please contact your pharmacy and they will refill your medication.    If your prescription bottle indicates you do not have any refills left, you may request refills at any time through one of the following ways: The online [employer] system (except Urgent Care), by calling your provider’s office, or by asking your pharmacy to contact your provider’s office with a refill request. Medication refills are processed only during regular business hours and may not be available until the next business day. Your provider may  request additional information or to have a follow-up visit with you prior to refilling your medication.   *Please Note: Medication refills are assigned a new Rx number when refilled electronically. Your pharmacy may indicate that no refills were authorized even though a new prescription for the same medication is available at the pharmacy. Please request the medicine by name with the pharmacy before contacting your provider for a refill.           [employer] Access Code: [email or web address]  Expires: 5/4/2017  6:36 PM    [employer]  A secure, online tool to manage your health information     [employer]s [employer]® is a secure, online tool that connects you to your personalized health information from the privacy of your home -- day or night - making it very easy for you to manage your healthcare. Once the activation process is completed, you can even access your medical information using the [employer] josephine, which is available for free in the Apple Josephine store or Google Play store.     [employer] provides the following levels of access (as shown below):   My Chart Features   [facility] Doctor [facility]  Specialists [facility]  Urgent  Care [facility]  Primary Care  Doctor   Email your healthcare team securely and privately 24/7 X X X    Manage appointments: schedule your next appointment; view details of past/upcoming appointments X      Request prescription refills. X      View recent personal medical records, including lab and immunizations X X X X   View health record, including health history, allergies, medications X X X X   Read reports about your outpatient visits, procedures, consult and ER notes X X X X   See your discharge summary, which is a recap of your hospital and/or ER visit that includes your diagnosis, lab results, and care plan. X X       How to register for [employer]:  1. Go to  [email or web address].  2. Click on the Sign Up Now box, which takes you to the New Member Sign Up page. You will need to  provide the following information:  a. Enter your [employer] Access Code exactly as it appears at the top of this page. (You will not need to use this code after you’ve completed the sign-up process. If you do not sign up before the expiration date, you must request a new code.)   b. Enter your date of birth.   c. Enter your home email address.   d. Click Submit, and follow the next screen’s instructions.  3. Create a [employer] ID. This will be your [employer] login ID and cannot be changed, so think of one that is secure and easy to remember.  4. Create a [employer] password. You can change your password at any time.  5. Enter your Password Reset Question and Answer. This can be used at a later time if you forget your password.   6. Enter your e-mail address. This allows you to receive e-mail notifications when new information is available in [employer].  7. Click Sign Up. You can now view your health information.    For assistance activating your [employer] account, call [phone]

## 2017-04-27 NOTE — Clinical Note
April 27, 2017         Patient: Berenice Holden   YOB: 1995   Date of Visit: 4/27/2017           To Whom it May Concern:    Berenice Holden was seen in my clinic on 4/27/2017.    If you have any questions or concerns, please don't hesitate to call.        Sincerely,           Mazin Alvares M.D.  Electronically Signed

## 2017-04-28 NOTE — PROGRESS NOTES
"Subjective:      Berenice Holden is a 21 y.o. female who presents with Dog Bite            Animal Bite  This is a new problem. The current episode started today (1  hr ago). The problem occurs constantly. The problem has been unchanged. Pertinent negatives include no chest pain, chills, fever or nausea. Treatments tried: hydrogen peroxide.       Review of Systems   Constitutional: Negative for fever and chills.   Respiratory: Negative for shortness of breath.    Cardiovascular: Negative for chest pain.   Gastrointestinal: Negative for nausea.     PMH:  has no past medical history of Congestive heart failure (CMS-HCC), Cancer (CMS-Formerly [facility]), Infectious disease, COPD, Diabetes, ASTHMA, CAD (coronary artery disease), Stroke (CMS-Formerly [facility]), Seizure disorder (CMS-Formerly [facility]), Hypertension, Renal disorder, Liver disease, or Psychiatric disorder.  MEDS:   Current outpatient prescriptions:   •  Norgestim-Eth Estrad Triphasic (ORTHO TRI-CYCLEN, 28,) 0.18/0.215/0.25 MG-35 MCG Tab, Take 1 Tab by mouth every day., Disp: 28 Tab, Rfl: 0  •  predniSONE (DELTASONE) 10 MG Tab, Take 1 tablet 3 times daily for 5 days., Disp: 15 Tab, Rfl: 0  •  amoxicillin (AMOXIL) 875 MG tablet, Take 1 Tab by mouth 2 times a day., Disp: 20 Tab, Rfl: 0  ALLERGIES: No Known Allergies  SURGHX: History reviewed. No pertinent past surgical history.  SOCHX:  reports that she has never smoked. She has never used smokeless tobacco. She reports that she drinks alcohol. She reports that she does not use illicit drugs.  FH: Family history was reviewed, no pertinent findings to report       Objective:     /82 mmHg  Pulse 77  Temp(Src) 36.8 °C (98.2 °F)  Resp 16  Ht 1.6 m (5' 3\")  Wt 53.524 kg (118 lb)  BMI 20.91 kg/m2  SpO2 99%     Physical Exam   Constitutional: She appears well-developed. No distress.   Pulmonary/Chest: Effort normal.   Skin: Skin is warm and dry. Laceration noted. She is not diaphoretic. No erythema.        2 puncture wounds   Psychiatric: She has a " normal mood and affect. Her behavior is normal.               Assessment/Plan:     1. Dog bite, initial encounter  TDAP VACCINE =>8YO IM    amoxicillin-clavulanate (AUGMENTIN) 875-125 MG Tab     Dog bite by neighbor's dog  Unknown if shots up to date  Filed a complaint with her complex  Health dpt report will be completed through us, however patient does not know the owner/dog other than lives in her complex

## 2017-05-03 ENCOUNTER — HOSPITAL ENCOUNTER (OUTPATIENT)
Facility: MEDICAL CENTER | Age: 22
End: 2017-05-03
Attending: NURSE PRACTITIONER
Payer: COMMERCIAL

## 2017-05-03 ENCOUNTER — OFFICE VISIT (OUTPATIENT)
Dept: MEDICAL GROUP | Facility: MEDICAL CENTER | Age: 22
End: 2017-05-03
Payer: COMMERCIAL

## 2017-05-03 VITALS
SYSTOLIC BLOOD PRESSURE: 110 MMHG | TEMPERATURE: 98.4 F | HEIGHT: 63 IN | WEIGHT: 122 LBS | DIASTOLIC BLOOD PRESSURE: 66 MMHG | BODY MASS INDEX: 21.62 KG/M2 | HEART RATE: 121 BPM | OXYGEN SATURATION: 100 %

## 2017-05-03 DIAGNOSIS — N89.8 VAGINAL DISCHARGE: ICD-10-CM

## 2017-05-03 DIAGNOSIS — Z13.220 SCREENING FOR HYPERLIPIDEMIA: ICD-10-CM

## 2017-05-03 DIAGNOSIS — Z12.4 ROUTINE CERVICAL SMEAR: ICD-10-CM

## 2017-05-03 DIAGNOSIS — R22.1 NECK MASS: ICD-10-CM

## 2017-05-03 DIAGNOSIS — R30.0 DYSURIA: ICD-10-CM

## 2017-05-03 DIAGNOSIS — Z13.29 SCREENING FOR THYROID DISORDER: ICD-10-CM

## 2017-05-03 DIAGNOSIS — Z01.419 ROUTINE GYNECOLOGICAL EXAMINATION: ICD-10-CM

## 2017-05-03 DIAGNOSIS — Z23 NEED FOR HPV VACCINATION: ICD-10-CM

## 2017-05-03 DIAGNOSIS — Z13.89 SCREENING FOR MULTIPLE CONDITIONS: ICD-10-CM

## 2017-05-03 DIAGNOSIS — Z13.21 ENCOUNTER FOR VITAMIN DEFICIENCY SCREENING: ICD-10-CM

## 2017-05-03 DIAGNOSIS — Z13.0 SCREENING, ANEMIA, DEFICIENCY, IRON: ICD-10-CM

## 2017-05-03 LAB
APPEARANCE UR: CLEAR
BILIRUB UR STRIP-MCNC: NORMAL MG/DL
COLOR UR AUTO: YELLOW
GLUCOSE UR STRIP.AUTO-MCNC: NORMAL MG/DL
KETONES UR STRIP.AUTO-MCNC: NORMAL MG/DL
LEUKOCYTE ESTERASE UR QL STRIP.AUTO: NORMAL
NITRITE UR QL STRIP.AUTO: NORMAL
PH UR STRIP.AUTO: 6 [PH] (ref 5–8)
PROT UR QL STRIP: NORMAL MG/DL
RBC UR QL AUTO: NORMAL
SP GR UR STRIP.AUTO: 1.02
UROBILINOGEN UR STRIP-MCNC: NORMAL MG/DL

## 2017-05-03 PROCEDURE — 87480 CANDIDA DNA DIR PROBE: CPT

## 2017-05-03 PROCEDURE — 87591 N.GONORRHOEAE DNA AMP PROB: CPT

## 2017-05-03 PROCEDURE — 88175 CYTOPATH C/V AUTO FLUID REDO: CPT

## 2017-05-03 PROCEDURE — 87491 CHLMYD TRACH DNA AMP PROBE: CPT

## 2017-05-03 PROCEDURE — 87660 TRICHOMONAS VAGIN DIR PROBE: CPT

## 2017-05-03 PROCEDURE — 87510 GARDNER VAG DNA DIR PROBE: CPT

## 2017-05-03 PROCEDURE — 99395 PREV VISIT EST AGE 18-39: CPT | Performed by: NURSE PRACTITIONER

## 2017-05-03 PROCEDURE — 81002 URINALYSIS NONAUTO W/O SCOPE: CPT | Performed by: NURSE PRACTITIONER

## 2017-05-03 RX ORDER — OMEGA-3/DHA/EPA/FISH OIL 1000 MG
2 CAPSULE ORAL DAILY
Qty: 60 CAP | Refills: 0
Start: 2017-05-03 | End: 2018-02-26

## 2017-05-03 RX ORDER — MEDROXYPROGESTERONE ACETATE 150 MG/ML
150 INJECTION, SUSPENSION INTRAMUSCULAR ONCE
Qty: 1 ML | Refills: 0
Start: 2017-05-03 | End: 2017-05-03

## 2017-05-03 ASSESSMENT — PATIENT HEALTH QUESTIONNAIRE - PHQ9: CLINICAL INTERPRETATION OF PHQ2 SCORE: 0

## 2017-05-04 ENCOUNTER — TELEPHONE (OUTPATIENT)
Dept: MEDICAL GROUP | Facility: MEDICAL CENTER | Age: 22
End: 2017-05-04

## 2017-05-04 DIAGNOSIS — N76.0 GARDNERELLA ASSOCIATED VAGINAL DISCHARGE: ICD-10-CM

## 2017-05-04 DIAGNOSIS — B96.89 GARDNERELLA ASSOCIATED VAGINAL DISCHARGE: ICD-10-CM

## 2017-05-04 LAB
CANDIDA DNA VAG QL PROBE+SIG AMP: NEGATIVE
G VAGINALIS DNA VAG QL PROBE+SIG AMP: POSITIVE
T VAGINALIS DNA VAG QL PROBE+SIG AMP: NEGATIVE

## 2017-05-04 RX ORDER — METRONIDAZOLE 500 MG/1
500 TABLET ORAL 3 TIMES DAILY
Qty: 30 TAB | Refills: 0 | Status: SHIPPED | OUTPATIENT
Start: 2017-05-04 | End: 2018-02-26

## 2017-05-04 NOTE — TELEPHONE ENCOUNTER
Please let pt know that the vaginal affirm cx shows gardnerella. I have sent over a presc for flagyl.

## 2017-05-04 NOTE — PROGRESS NOTES
Subjective:      Berenice Holden is a 21 y.o. female who presents with No chief complaint on file.            HPI  Seen in f/u for pap smear.  She is on antibx for a dog bite.    She intially had dysuria on sunday.  Then had intercourse later on sunday. On monday noted small tears around vaginal area and around rectum.  No anal intercourse.    Last week she startred having white vaginal dg.  No abd or back pain.    UA IN OFFICe today shows leukocytes and small blood.  She has had bleeding from the tears.  Small amt only.    Her pg test is neg.  Having some allergies with chest congestion.        Patient Active Problem List    Diagnosis Date Noted   • Family planning 06/17/2016     Current Outpatient Prescriptions   Medication Sig Dispense Refill   • amoxicillin-clavulanate (AUGMENTIN) 875-125 MG Tab Take 1 Tab by mouth 2 times a day for 10 days. 20 Tab 0     No current facility-administered medications for this visit.     No Known Allergies    ROS  Review of Systems   Constitutional: Negative.  Negative for fever, chills, weight loss, malaise/fatigue and diaphoresis.   HENT: Negative.  Negative for hearing loss, ear pain, nosebleeds, sore throat, neck pain, tinnitus and ear discharge.    Eyes: Negative.  Negative for blurred vision, double vision, photophobia, pain, discharge and redness.   Respiratory: Negative.  Negative for cough, hemoptysis, sputum production, shortness of breath, wheezing and stridor.    Cardiovascular: Negative.  Negative for chest pain, palpitations, orthopnea, claudication, leg swelling and PND.   Gastrointestinal: Negative.  Negative for heartburn, nausea, vomiting, abdominal pain, diarrhea, constipation, blood in stool and melena.   Genitourinary: Negative.  Negative for urgency, frequency, incontinence, flank pain.   Musculoskeletal: Negative.  Negative for myalgias, back pain, joint pain and falls.   Skin: Negative.  Negative for itching and rash.   Neurological: Negative.  Negative for  "dizziness, tingling, tremors, sensory change, speech change, focal weakness, seizures, loss of consciousness, weakness and headaches.   Endo/Heme/Allergies: Negative.  Negative for polydipsia. Does not bruise/bleed easily.    Psychiatric/Behavioral: Negative.  Negative for depression, suicidal ideas, hallucinations, memory loss and substance abuse. The patient is not nervous/anxious and does not have insomnia.    All other systems reviewed and are negative.           Objective:     /66 mmHg  Pulse 121  Temp(Src) 36.9 °C (98.4 °F)  Ht 1.6 m (5' 3\")  Wt 55.339 kg (122 lb)  BMI 21.62 kg/m2  SpO2 100%  LMP 03/27/2017  Breastfeeding? No     Physical Exam  Physical Exam   Vitals reviewed.  Constitutional: oriented to person, place, and time. appears well-developed and well-nourished. No distress.   HENT: Head: Normocephalic and atraumatic. Bilateral tympanic membranes wnl w/o bulging.  Right Ear: External ear normal. Left Ear: External ear normal. Nose: Nose normal.  Mouth/Throat: Oropharynx is clear and moist. No oropharyngeal exudate. zofia tm wnl. Eyes: Conjunctivae and EOM are normal. Pupils are equal, round, and reactive to light. Right eye exhibits no discharge. Left eye exhibits no discharge. No scleral icterus.  firm movable nodules noted zofia lower neck, nontender.   Neck: Normal range of motion. Neck supple. No JVD present.   Cardiovascular: Normal rate, regular rhythm, normal heart sounds and intact distal pulses.  Exam reveals no gallop and no friction rub.  No murmur heard.  No carotid bruits   Pulmonary/Chest: Effort normal and breath sounds normal. No stridor. No respiratory distress. no wheezes or rales. exhibits no tenderness.   Abdominal: Soft. Bowel sounds are normal. exhibits no distension and no mass. No tenderness. no rebound and no guarding.   Musculoskeletal: Normal range of motion. exhibits no edema or tenderness.  zofia pedal pulses 2+.  Lymphadenopathy:  no cervical or supraclavicular " adenopathy.   Neurological: alert and oriented to person, place, and time. has normal reflexes. displays normal reflexes. No cranial nerve deficit. exhibits normal muscle tone. Coordination normal.   Skin: Skin is warm and dry. No rash noted. no diaphoresis. No erythema. No pallor.   Psychiatric: normal mood and affect. behavior is normal.   SUBJECTIVE: 21 y.o. female for annual routine pap and checkup.  Gyn History:   Last Pap: none  Contraception: depo  H/O Abnormal Pap no  H/O STI 2/3/16 chlamydia  Current partner: monogamous  Lmp:LMP Date: 17  ROS:  Menses every month with no excessive cramping or bleeding.  No analgesics required during menses.  No pelvic pain, vaginal discharge or dyspareunia.  No breast tenderness, mass, nipple discharge, changes in size or contour, or abnormal cyclic discomfort.    Breast Exam:Performed with instruction during examination. Breasts equal size and shape.  No axillary lymphadenopathy, no skin changes, no dominant masses. No nipple retraction  Pelvic Exam - Sure Path Pap obtained and specimen sent to lab. Normal external genitalia with no lesions. Normal vaginal mucosa with normal rugation. Cervix has no visible lesions. No cervical motion tenderness. Uterus is normal sized with no masses. No adnexal tenderness or enlargement appreciated. Mod amt white creamy vaginal discharge noted.  No vaginal tears noted.            Assessment/Plan:     1. Vaginal discharge  POCT Urinalysis    VAGINAL PATHOGENS DNA PANEL    affirm cx to lab.     2. Routine gynecological examination  POCT Pregnancy    THINPREP RFLX HPV ASCUS W/CTNG    pap smear.  f/u with pt with results.    3. Routine cervical smear  POCT Pregnancy    THINPREP RFLX HPV ASCUS W/CTNG   4. Dysuria  POCT Pregnancy    POCT Urinalysis    VAGINAL PATHOGENS DNA PANEL    she is on antibx will f/u if persists   5. Neck mass  US-SOFT TISSUES OF HEAD - NECK    neck us for zofia lower neck noduiles.  f/u wiht pt wiht results   6.  Screening for hyperlipidemia  LIPID PROFILE    do routine lab and f/u wiht pt wiht results then yrly or sooner if lababn   7. Screening, anemia, deficiency, iron  CBC WITH DIFFERENTIAL   8. Encounter for vitamin deficiency screening  VITAMIN D,25 HYDROXY   9. Screening for thyroid disorder  TSH   10. Screening for multiple conditions  COMP METABOLIC PANEL   11. Need for HPV vaccination      since she is on antbix will do HPV next week

## 2017-05-06 ENCOUNTER — HOSPITAL ENCOUNTER (OUTPATIENT)
Dept: LAB | Facility: MEDICAL CENTER | Age: 22
End: 2017-05-06
Attending: NURSE PRACTITIONER
Payer: COMMERCIAL

## 2017-05-06 DIAGNOSIS — Z13.220 SCREENING FOR HYPERLIPIDEMIA: ICD-10-CM

## 2017-05-06 DIAGNOSIS — Z13.29 SCREENING FOR THYROID DISORDER: ICD-10-CM

## 2017-05-06 DIAGNOSIS — Z13.0 SCREENING, ANEMIA, DEFICIENCY, IRON: ICD-10-CM

## 2017-05-06 DIAGNOSIS — Z13.89 SCREENING FOR MULTIPLE CONDITIONS: ICD-10-CM

## 2017-05-06 DIAGNOSIS — Z13.21 ENCOUNTER FOR VITAMIN DEFICIENCY SCREENING: ICD-10-CM

## 2017-05-06 LAB
25(OH)D3 SERPL-MCNC: 10 NG/ML (ref 30–100)
ALBUMIN SERPL BCP-MCNC: 4.3 G/DL (ref 3.2–4.9)
ALBUMIN/GLOB SERPL: 1.2 G/DL
ALP SERPL-CCNC: 58 U/L (ref 30–99)
ALT SERPL-CCNC: 14 U/L (ref 2–50)
ANION GAP SERPL CALC-SCNC: 7 MMOL/L (ref 0–11.9)
AST SERPL-CCNC: 16 U/L (ref 12–45)
BASOPHILS # BLD AUTO: 0.7 % (ref 0–1.8)
BASOPHILS # BLD: 0.06 K/UL (ref 0–0.12)
BILIRUB SERPL-MCNC: 0.5 MG/DL (ref 0.1–1.5)
BUN SERPL-MCNC: 14 MG/DL (ref 8–22)
C TRACH DNA GENITAL QL NAA+PROBE: NEGATIVE
CALCIUM SERPL-MCNC: 9.9 MG/DL (ref 8.5–10.5)
CHLORIDE SERPL-SCNC: 107 MMOL/L (ref 96–112)
CHOLEST SERPL-MCNC: 145 MG/DL (ref 100–199)
CO2 SERPL-SCNC: 24 MMOL/L (ref 20–33)
CREAT SERPL-MCNC: 0.69 MG/DL (ref 0.5–1.4)
CYTOLOGY REG CYTOL: NORMAL
EOSINOPHIL # BLD AUTO: 0.3 K/UL (ref 0–0.51)
EOSINOPHIL NFR BLD: 3.4 % (ref 0–6.9)
ERYTHROCYTE [DISTWIDTH] IN BLOOD BY AUTOMATED COUNT: 43.1 FL (ref 35.9–50)
GFR SERPL CREATININE-BSD FRML MDRD: >60 ML/MIN/1.73 M 2
GLOBULIN SER CALC-MCNC: 3.6 G/DL (ref 1.9–3.5)
GLUCOSE SERPL-MCNC: 83 MG/DL (ref 65–99)
HCT VFR BLD AUTO: 40.5 % (ref 37–47)
HDLC SERPL-MCNC: 63 MG/DL
HGB BLD-MCNC: 12.9 G/DL (ref 12–16)
IMM GRANULOCYTES # BLD AUTO: 0.03 K/UL (ref 0–0.11)
IMM GRANULOCYTES NFR BLD AUTO: 0.3 % (ref 0–0.9)
LDLC SERPL CALC-MCNC: 77 MG/DL
LYMPHOCYTES # BLD AUTO: 2.73 K/UL (ref 1–4.8)
LYMPHOCYTES NFR BLD: 31.2 % (ref 22–41)
MCH RBC QN AUTO: 27 PG (ref 27–33)
MCHC RBC AUTO-ENTMCNC: 31.9 G/DL (ref 33.6–35)
MCV RBC AUTO: 84.9 FL (ref 81.4–97.8)
MONOCYTES # BLD AUTO: 0.45 K/UL (ref 0–0.85)
MONOCYTES NFR BLD AUTO: 5.1 % (ref 0–13.4)
N GONORRHOEA DNA GENITAL QL NAA+PROBE: NEGATIVE
NEUTROPHILS # BLD AUTO: 5.19 K/UL (ref 2–7.15)
NEUTROPHILS NFR BLD: 59.3 % (ref 44–72)
NRBC # BLD AUTO: 0 K/UL
NRBC BLD AUTO-RTO: 0 /100 WBC
PLATELET # BLD AUTO: 267 K/UL (ref 164–446)
PMV BLD AUTO: 11.6 FL (ref 9–12.9)
POTASSIUM SERPL-SCNC: 4.3 MMOL/L (ref 3.6–5.5)
PROT SERPL-MCNC: 7.9 G/DL (ref 6–8.2)
RBC # BLD AUTO: 4.77 M/UL (ref 4.2–5.4)
SODIUM SERPL-SCNC: 138 MMOL/L (ref 135–145)
SPECIMEN SOURCE: NORMAL
TRIGL SERPL-MCNC: 23 MG/DL (ref 0–149)
TSH SERPL DL<=0.005 MIU/L-ACNC: 1.12 UIU/ML (ref 0.3–3.7)
WBC # BLD AUTO: 8.8 K/UL (ref 4.8–10.8)

## 2017-05-06 PROCEDURE — 85025 COMPLETE CBC W/AUTO DIFF WBC: CPT

## 2017-05-06 PROCEDURE — 80061 LIPID PANEL: CPT

## 2017-05-06 PROCEDURE — 80053 COMPREHEN METABOLIC PANEL: CPT

## 2017-05-06 PROCEDURE — 36415 COLL VENOUS BLD VENIPUNCTURE: CPT

## 2017-05-06 PROCEDURE — 82306 VITAMIN D 25 HYDROXY: CPT

## 2017-05-06 PROCEDURE — 84443 ASSAY THYROID STIM HORMONE: CPT

## 2017-05-08 ENCOUNTER — TELEPHONE (OUTPATIENT)
Dept: MEDICAL GROUP | Facility: MEDICAL CENTER | Age: 22
End: 2017-05-08

## 2017-05-08 DIAGNOSIS — B00.9 HSV INFECTION: ICD-10-CM

## 2017-05-08 RX ORDER — ERGOCALCIFEROL 1.25 MG/1
50000 CAPSULE ORAL
Qty: 12 CAP | Refills: 0 | Status: SHIPPED | OUTPATIENT
Start: 2017-05-08 | End: 2017-07-30 | Stop reason: SDUPTHER

## 2017-05-08 NOTE — TELEPHONE ENCOUNTER
Please let pt know that the pap smear and vaginal cx for gc/ch is wnl.  However the lesions that she has are herpes.  She will have infected any partners that she has.  If she is still having sx they will improve.  If she wants i can give her some meds for the next time it occurs but would like to see her to explain how to use.

## 2017-05-08 NOTE — TELEPHONE ENCOUNTER
Please let pt know that the CMP, CBC, LP, GFR, TSH is wnl.  VITAMIN D is low at 10.  Needs 12 weeks of vitamin d replacement once weekly.  Then start over the counter Vitamin D3 at least 2000 units daily.

## 2017-05-09 RX ORDER — ACYCLOVIR 400 MG/1
400 TABLET ORAL 3 TIMES DAILY PRN
Qty: 9 TAB | Refills: 1 | Status: SHIPPED | OUTPATIENT
Start: 2017-05-09 | End: 2017-05-30 | Stop reason: SDUPTHER

## 2017-05-09 NOTE — TELEPHONE ENCOUNTER
Pt is asking for the script for he herpes -pt is stating she is feeling nauseous and is requesting ondansetron(disolvable tabs)

## 2017-05-09 NOTE — TELEPHONE ENCOUNTER
Herpes does not make her nauseated.  The acyclovir only helps if she takes it at the first sign of infection.  She will have to use it next time.  Won't help this time. I will go ahead and send some in

## 2017-05-09 NOTE — TELEPHONE ENCOUNTER
Gave pt info-pt is still asking for the ondansetron  Pt states the last 3 days she has been feeling nauseated

## 2017-05-10 NOTE — TELEPHONE ENCOUNTER
It could be from the diflucan.  Is she taking that?  If not she needs to be seen.  If yes then i will call her in a small supply of zofran

## 2017-05-12 ENCOUNTER — HOSPITAL ENCOUNTER (OUTPATIENT)
Dept: RADIOLOGY | Facility: MEDICAL CENTER | Age: 22
End: 2017-05-12
Attending: NURSE PRACTITIONER
Payer: COMMERCIAL

## 2017-05-12 DIAGNOSIS — R22.1 NECK MASS: ICD-10-CM

## 2017-05-12 PROCEDURE — 76536 US EXAM OF HEAD AND NECK: CPT

## 2017-05-15 ENCOUNTER — TELEPHONE (OUTPATIENT)
Dept: MEDICAL GROUP | Facility: MEDICAL CENTER | Age: 22
End: 2017-05-15

## 2017-05-16 NOTE — TELEPHONE ENCOUNTER
Please let pt know that the neck us shows mildly enlarged lymph nodes.  Would like to recheck in 3 months.  If not improved will do further w/u.  We will call her to order the neck us in august.

## 2017-05-22 RX ORDER — ACYCLOVIR 400 MG/1
TABLET ORAL
Refills: 1 | OUTPATIENT
Start: 2017-05-22

## 2017-05-22 NOTE — TELEPHONE ENCOUNTER
She doesn't need it again until she has another breakout.  It is not a med that she needs to take all the time, yet.

## 2017-05-30 DIAGNOSIS — B00.9 HSV INFECTION: ICD-10-CM

## 2017-05-30 RX ORDER — ACYCLOVIR 400 MG/1
400 TABLET ORAL 3 TIMES DAILY PRN
Qty: 9 TAB | Refills: 1 | Status: SHIPPED | OUTPATIENT
Start: 2017-05-30 | End: 2017-06-19 | Stop reason: SDUPTHER

## 2017-05-30 NOTE — TELEPHONE ENCOUNTER
Was the patient seen in the last year in this department? Yes     Does patient have an active prescription for medications requested? No     Received Request Via: Patient-pt requesting refills to please

## 2017-06-19 DIAGNOSIS — B00.9 HSV INFECTION: ICD-10-CM

## 2017-06-19 RX ORDER — ACYCLOVIR 400 MG/1
400 TABLET ORAL 3 TIMES DAILY PRN
Qty: 9 TAB | Refills: 1 | Status: SHIPPED | OUTPATIENT
Start: 2017-06-19 | End: 2017-07-24 | Stop reason: SDUPTHER

## 2017-06-19 NOTE — TELEPHONE ENCOUNTER
Pt is also asking to change from Depo to an Oral BC pill. Pt asking if an apt is needed to move forward with this as she is not happy on the Depo shot. Pt's due for her Depo on the 27th and asking when she would need to start on oral.

## 2017-06-21 ENCOUNTER — NON-PROVIDER VISIT (OUTPATIENT)
Dept: MEDICAL GROUP | Facility: MEDICAL CENTER | Age: 22
End: 2017-06-21
Payer: COMMERCIAL

## 2017-06-21 DIAGNOSIS — Z23 NEED FOR VACCINATION AGAINST HUMAN PAPILLOMAVIRUS: ICD-10-CM

## 2017-06-21 PROCEDURE — 90471 IMMUNIZATION ADMIN: CPT | Performed by: INTERNAL MEDICINE

## 2017-06-21 PROCEDURE — 90651 9VHPV VACCINE 2/3 DOSE IM: CPT | Performed by: INTERNAL MEDICINE

## 2017-06-21 NOTE — MR AVS SNAPSHOT
Berenice Holden   2017 1:00 PM   Non-Provider Visit   MRN: [id number]    Department:  [facility]   Dept Phone:  [phone]    Description:  Female : 1995   Provider:  [facility]           Reason for Visit     Immunizations HPV9      Allergies as of 2017     No Known Allergies      You were diagnosed with     Need for vaccination against human papillomavirus   [536965]         Vital Signs     Smoking Status                   Never Smoker            Basic Information     Date Of Birth Sex Race Ethnicity Preferred Language    1995 Female Black or  Non- English      Your appointments     2017 10:00 AM   Established Patient with Marbella Cadet M.D.   [facility] ([facility])    [address]  [facility] [address] [phone]           You will be receiving a confirmation call a few days before your appointment from our automated call confirmation system.              Problem List              ICD-10-CM Priority Class Noted - Resolved    Family planning Z30.09   2016 - Present      Health Maintenance        Date Due Completion Dates    IMM VARICELLA (CHICKENPOX) VACCINE (1 of 2 - 2 Dose Adolescent Series) 2008 ---    IMM MENINGOCOCCAL VACCINE (MCV4) (1 of 1) 2011 ---    IMM HPV VACCINE (2 of 3 - Female 3 Dose Series) 3/30/2016 2/3/2016    PAP SMEAR 5/3/2020 5/3/2017, 5/3/2017 (Done)    Override on 5/3/2017: Done    IMM DTaP/Tdap/Td Vaccine (3 - Td) 2027, 2008            Current Immunizations     HPV 9-VALENT VACCINE (GARDASIL 9) 2017    HPV Quadrivalent Vaccine (GARDASIL) 2/3/2016  4:57 PM    Hepatitis A Vaccine, Ped/Adol 2006, 2001    Hepatitis B Vaccine Non-Recombivax (Ped/Adol) 2003, 2001, 2000, 2000    Tdap Vaccine 2017  6:36 PM, 2008      Below and/or attached are the medications your provider expects you to take. Review all  of your home medications and newly ordered medications with your provider and/or pharmacist. Follow medication instructions as directed by your provider and/or pharmacist. Please keep your medication list with you and share with your provider. Update the information when medications are discontinued, doses are changed, or new medications (including over-the-counter products) are added; and carry medication information at all times in the event of emergency situations     Allergies:  No Known Allergies          Medications  Valid as of: June 21, 2017 -  2:01 PM    Generic Name Brand Name Tablet Size Instructions for use    Acyclovir (Tab) ZOVIRAX 400 MG Take 1 Tab by mouth 3 times a day as needed.        Ergocalciferol (Cap) DRISDOL 36876 UNITS Take 1 Cap by mouth every 7 days.        MetroNIDAZOLE (Tab) FLAGYL 500 MG Take 1 Tab by mouth 3 times a day.        Probiotic Product (Cap) PROBIOTIC ACIDOPHILUS  Take 2 Caps by mouth every day.        .                 Medicines prescribed today were sent to:     [facility] #[id number] - [place], [place] - [address]    [address] [place] [address]    Phone: [phone] Fax: [phone]    Open 24 Hours?: No      Medication refill instructions:       If your prescription bottle indicates you have medication refills left, it is not necessary to call your provider’s office. Please contact your pharmacy and they will refill your medication.    If your prescription bottle indicates you do not have any refills left, you may request refills at any time through one of the following ways: The online [employer] system (except Urgent Care), by calling your provider’s office, or by asking your pharmacy to contact your provider’s office with a refill request. Medication refills are processed only during regular business hours and may not be available until the next business day. Your provider may request additional information or to have a follow-up visit with you prior to refilling your  medication.   *Please Note: Medication refills are assigned a new Rx number when refilled electronically. Your pharmacy may indicate that no refills were authorized even though a new prescription for the same medication is available at the pharmacy. Please request the medicine by name with the pharmacy before contacting your provider for a refill.           [employer] Access Code: Activation code not generated  Current "[employer] Status: Active

## 2017-06-21 NOTE — PROGRESS NOTES
"Berenice Holden is a 21 y.o. female here for a non-provider visit for:   HPV 2 of 3    Reason for immunization: Overdue/Provider Recommended  Immunization records indicate need for vaccine: Yes, confirmed with Epic  Minimum interval has been met for this vaccine: Yes  ABN completed: Not Indicated    Order and dose verified by: AED  VIS Dated  12-2-2016 was given to patient: Yes  All IAC Questionnaire questions were answered “No.\"    Patient tolerated injection and no adverse effects were observed or reported: Yes    Pt scheduled for next dose in series: Yes      "

## 2017-06-22 ENCOUNTER — OFFICE VISIT (OUTPATIENT)
Dept: MEDICAL GROUP | Facility: MEDICAL CENTER | Age: 22
End: 2017-06-22
Payer: COMMERCIAL

## 2017-06-22 VITALS
WEIGHT: 117 LBS | BODY MASS INDEX: 20.73 KG/M2 | HEART RATE: 92 BPM | HEIGHT: 63 IN | DIASTOLIC BLOOD PRESSURE: 80 MMHG | TEMPERATURE: 98.9 F | OXYGEN SATURATION: 97 % | SYSTOLIC BLOOD PRESSURE: 122 MMHG

## 2017-06-22 DIAGNOSIS — Z30.09 FAMILY PLANNING: ICD-10-CM

## 2017-06-22 DIAGNOSIS — N92.6 IRREGULAR MENSES: ICD-10-CM

## 2017-06-22 DIAGNOSIS — E55.9 VITAMIN D DEFICIENCY DISEASE: ICD-10-CM

## 2017-06-22 PROCEDURE — 99213 OFFICE O/P EST LOW 20 MIN: CPT | Performed by: FAMILY MEDICINE

## 2017-06-22 RX ORDER — NORGESTIMATE AND ETHINYL ESTRADIOL 7DAYSX3 28
1 KIT ORAL DAILY
Qty: 28 TAB | Refills: 12 | Status: SHIPPED | OUTPATIENT
Start: 2017-06-22

## 2017-06-22 NOTE — MR AVS SNAPSHOT
"        Berenice Holden   2017 10:00 AM   Office Visit   MRN: [id number]    Department:  [facility]   Dept Phone:  [phone]    Description:  Female : 1995   Provider:  Marbella Cadet M.D.           Reason for Visit     Contraception           Allergies as of 2017     No Known Allergies      You were diagnosed with     Family planning   [213545]       Irregular menses   [429554]       Vitamin D deficiency disease   [291812]         Vital Signs     Blood Pressure Pulse Temperature Height Weight Body Mass Index    122/80 mmHg 92 37.2 °C (98.9 °F) 1.6 m (5' 2.99\") 53.071 kg (117 lb) 20.73 kg/m2    Oxygen Saturation Last Menstrual Period Breastfeeding? Smoking Status          97% 2017 No Never Smoker         Basic Information     Date Of Birth Sex Race Ethnicity Preferred Language    1995 Female Black or  Non- English      Problem List              ICD-10-CM Priority Class Noted - Resolved    Family planning Z30.09   2016 - Present    Irregular menses N92.6   2017 - Present    Vitamin D deficiency disease E55.9   2017 - Present      Health Maintenance        Date Due Completion Dates    IMM VARICELLA (CHICKENPOX) VACCINE (1 of 2 - 2 Dose Adolescent Series) 2008 ---    IMM MENINGOCOCCAL VACCINE (MCV4) (1 of 1) 2011 ---    IMM HPV VACCINE (3 of 3 - Female 3 Dose Series) 10/21/2017 2017, 2/3/2016    PAP SMEAR 5/3/2020 5/3/2017, 5/3/2017 (Done)    Override on 5/3/2017: Done    IMM DTaP/Tdap/Td Vaccine (3 - Td) 2027, 2008            Current Immunizations     HPV 9-VALENT VACCINE (GARDASIL 9) 2017    HPV Quadrivalent Vaccine (GARDASIL) 2/3/2016  4:57 PM    Hepatitis A Vaccine, Ped/Adol 2006, 2001    Hepatitis B Vaccine Non-Recombivax (Ped/Adol) 2003, 2001, 2000, 2000    Tdap Vaccine 2017  6:36 PM, 2008      Below and/or attached are the medications your provider expects you " to take. Review all of your home medications and newly ordered medications with your provider and/or pharmacist. Follow medication instructions as directed by your provider and/or pharmacist. Please keep your medication list with you and share with your provider. Update the information when medications are discontinued, doses are changed, or new medications (including over-the-counter products) are added; and carry medication information at all times in the event of emergency situations     Allergies:  No Known Allergies          Medications  Valid as of: June 22, 2017 - 11:46 AM    Generic Name Brand Name Tablet Size Instructions for use    Acyclovir (Tab) ZOVIRAX 400 MG Take 1 Tab by mouth 3 times a day as needed.        Ergocalciferol (Cap) DRISDOL 11655 UNITS Take 1 Cap by mouth every 7 days.        MetroNIDAZOLE (Tab) FLAGYL 500 MG Take 1 Tab by mouth 3 times a day.        Norgestim-Eth Estrad Triphasic (Tab) Norgestim-Eth Estrad Triphasic 0.18/0.215/0.25 MG-35 MCG Take 1 Tab by mouth every day.        Probiotic Product (Cap) PROBIOTIC ACIDOPHILUS  Take 2 Caps by mouth every day.        .                 Medicines prescribed today were sent to:     [facility]Y #[id number] [place], [place] - [address]    [address] [facility] [address]    Phone: [phone] Fax: [phone]    Open 24 Hours?: No      Medication refill instructions:       If your prescription bottle indicates you have medication refills left, it is not necessary to call your provider’s office. Please contact your pharmacy and they will refill your medication.    If your prescription bottle indicates you do not have any refills left, you may request refills at any time through one of the following ways: The online [employer] system (except Urgent Care), by calling your provider’s office, or by asking your pharmacy to contact your provider’s office with a refill request. Medication refills are processed only during regular business hours and may not  be available until the next business day. Your provider may request additional information or to have a follow-up visit with you prior to refilling your medication.   *Please Note: Medication refills are assigned a new Rx number when refilled electronically. Your pharmacy may indicate that no refills were authorized even though a new prescription for the same medication is available at the pharmacy. Please request the medicine by name with the pharmacy before contacting your provider for a refill.           [employer] Access Code: Activation code not generated  Current [employer] Status: Active

## 2017-06-27 NOTE — ASSESSMENT & PLAN NOTE
Patient is currently on Depo-Provera for birth control. She reports that she really hasn't liked this and is having persistent spotting. Her periods are quite irregular with the spotting in between. She is currently sexually active with a monogamous partner. She would like to go back on the oral contraceptive she was on in the past which were Ortho Tri-Cyclen. She thinks that she'll be better about taking them now.

## 2017-06-27 NOTE — PROGRESS NOTES
Subjective:     Chief Complaint   Patient presents with   • Contraception       Berenice Holden is a 21 y.o. female here today for evaluation and management of:    Family planning  Patient is currently on Depo-Provera for birth control. She reports that she really hasn't liked this and is having persistent spotting. Her periods are quite irregular with the spotting in between. She is currently sexually active with a monogamous partner. She would like to go back on the oral contraceptive she was on in the past which were Ortho Tri-Cyclen. She thinks that she'll be better about taking them now.    Vitamin D deficiency disease  Patient is also requesting a refill of her vitamin D. Her recent labs on 5/6/17 showed a level of 10. She denies any recent fractures.       No Known Allergies    Current medicines (including changes today)  Current Outpatient Prescriptions   Medication Sig Dispense Refill   • Norgestim-Eth Estrad Triphasic 0.18/0.215/0.25 MG-35 MCG Tab Take 1 Tab by mouth every day. 28 Tab 12   • acyclovir (ZOVIRAX) 400 MG tablet Take 1 Tab by mouth 3 times a day as needed. 9 Tab 1   • vitamin D, Ergocalciferol, (DRISDOL) 01531 UNITS Cap capsule Take 1 Cap by mouth every 7 days. 12 Cap 0   • Probiotic Product (PROBIOTIC ACIDOPHILUS) Cap Take 2 Caps by mouth every day. 60 Cap 0   • metronidazole (FLAGYL) 500 MG Tab Take 1 Tab by mouth 3 times a day. 30 Tab 0     No current facility-administered medications for this visit.       She  has a past medical history of Family planning. She also has no past medical history of Congestive heart failure (CMS-HCC), Cancer (CMS-HCC), Infectious disease, COPD, Diabetes, ASTHMA, CAD (coronary artery disease), Stroke (CMS-HCC), Seizure disorder (CMS-HCC), Hypertension, Renal disorder, Liver disease, or Psychiatric disorder.    Patient Active Problem List    Diagnosis Date Noted   • Irregular menses 06/22/2017   • Vitamin D deficiency disease 06/22/2017   • Family planning  "06/17/2016       ROS   No fever or chills.  No nausea or vomiting.  No chest pain or palpitations.  No cough or SOB.  No pain with urination or hematuria.  No black or bloody stools.       Objective:     Blood pressure 122/80, pulse 92, temperature 37.2 °C (98.9 °F), height 1.6 m (5' 2.99\"), weight 53.071 kg (117 lb), last menstrual period 06/16/2017, SpO2 97 %, not currently breastfeeding. Body mass index is 20.73 kg/(m^2).   Physical Exam:  Well developed, well nourished.  Alert, oriented in no acute distress.  Eye contact is good, speech goal directed, affect calm  Neck Supple.  No adenopathy or masses in the neck or supraclavicular regions. No thyromegaly  Lungs: clear to auscultation bilaterally with good excursion. No wheezes or rhonchi  CV: regular rate and rhythm. No murmur  Abdomen: soft, nontender, no masses or organomegaly.  No rebound or guarding            Assessment and Plan:   The following treatment plan was discussed    1. Family planning  Stressed importance of taking the medication at the same time each day. She can switch directly over. Discussed risks and benefits. No family or personal history of blood clots. No history of migraines with aura.   - Norgestim-Eth Estrad Triphasic 0.18/0.215/0.25 MG-35 MCG Tab; Take 1 Tab by mouth every day.  Dispense: 28 Tab; Refill: 12    2. Irregular menses  See #1    3. Vitamin D deficiency disease  Continue vitamin D supplementation    Any change or worsening of signs or symptoms, patient encouraged to follow-up or report to the emergency room for further evaluation. Patient understands and agrees.    Followup: Return if symptoms worsen or fail to improve. with her PCP         "

## 2017-06-27 NOTE — ASSESSMENT & PLAN NOTE
Patient is also requesting a refill of her vitamin D. Her recent labs on 5/6/17 showed a level of 10. She denies any recent fractures.

## 2017-07-21 DIAGNOSIS — B00.9 HSV INFECTION: ICD-10-CM

## 2017-07-21 NOTE — TELEPHONE ENCOUNTER
1. Caller Name: Berenice Holden                                         Call Back Number: [phone]      Patient approves a detailed voicemail message: N\A    Pt called and LM stating she needed a Rx refill. Pt did not leave the name of the medication needing to be refilled. I returned the call to Pt and LM requesting to call out office and give the name of medication requested.

## 2017-07-24 RX ORDER — ACYCLOVIR 400 MG/1
400 TABLET ORAL 3 TIMES DAILY PRN
Qty: 9 TAB | Refills: 1 | Status: SHIPPED | OUTPATIENT
Start: 2017-07-24 | End: 2017-08-17 | Stop reason: SDUPTHER

## 2017-07-24 NOTE — TELEPHONE ENCOUNTER
Was the patient seen in the last year in this department? Yes     Does patient have an active prescription for medications requested? No     Received Request Via: Patient     Pt called back and gave the name of the medication.

## 2017-07-31 RX ORDER — ERGOCALCIFEROL 1.25 MG/1
CAPSULE ORAL
Qty: 12 CAP | Refills: 0 | Status: SHIPPED | OUTPATIENT
Start: 2017-07-31 | End: 2018-02-26

## 2017-08-17 DIAGNOSIS — B00.9 HSV INFECTION: ICD-10-CM

## 2017-08-17 RX ORDER — ACYCLOVIR 400 MG/1
400 TABLET ORAL 3 TIMES DAILY PRN
Qty: 9 TAB | Refills: 1 | Status: SHIPPED | OUTPATIENT
Start: 2017-08-17 | End: 2017-09-11 | Stop reason: SDUPTHER

## 2017-09-11 DIAGNOSIS — B00.9 HSV INFECTION: ICD-10-CM

## 2017-09-12 RX ORDER — ACYCLOVIR 400 MG/1
400 TABLET ORAL 3 TIMES DAILY PRN
Qty: 9 TAB | Refills: 1 | Status: SHIPPED | OUTPATIENT
Start: 2017-09-12 | End: 2017-10-04 | Stop reason: SDUPTHER

## 2017-10-04 DIAGNOSIS — B00.9 HSV INFECTION: ICD-10-CM

## 2017-10-04 RX ORDER — ACYCLOVIR 400 MG/1
400 TABLET ORAL 3 TIMES DAILY PRN
Qty: 9 TAB | Refills: 1 | Status: SHIPPED | OUTPATIENT
Start: 2017-10-04 | End: 2017-11-17 | Stop reason: SDUPTHER

## 2017-11-17 DIAGNOSIS — B00.9 HSV INFECTION: ICD-10-CM

## 2017-11-18 RX ORDER — ACYCLOVIR 400 MG/1
400 TABLET ORAL 3 TIMES DAILY PRN
Qty: 9 TAB | Refills: 1 | Status: SHIPPED | OUTPATIENT
Start: 2017-11-18

## 2017-11-22 ENCOUNTER — NON-PROVIDER VISIT (OUTPATIENT)
Dept: URGENT CARE | Facility: CLINIC | Age: 22
End: 2017-11-22

## 2017-11-22 DIAGNOSIS — Z11.1 PPD SCREENING TEST: ICD-10-CM

## 2017-11-22 PROCEDURE — 86580 TB INTRADERMAL TEST: CPT | Performed by: FAMILY MEDICINE

## 2017-12-04 NOTE — LETTER
December 7, 2017        Berenice Holden  [address]   [address]  [place] [address]        Dear Berenice:    We have received a request from your pharmacy to refill your prescription(s). After careful review of your chart, you do not need the high dosage. Please take 2000 units of vidamin D over the counter.          If you have any questions or concerns, please don't hesitate to call.        Sincerely,        Malachi Borja M.D.    Electronically Signed

## 2017-12-06 RX ORDER — ERGOCALCIFEROL 1.25 MG/1
CAPSULE ORAL
Refills: 0 | OUTPATIENT
Start: 2017-12-06

## 2018-01-02 LAB — TB WHEAL 3D P 5 TU DIAM: NORMAL MM

## 2018-01-12 ENCOUNTER — OFFICE VISIT (OUTPATIENT)
Dept: URGENT CARE | Facility: CLINIC | Age: 23
End: 2018-01-12

## 2018-01-12 VITALS
SYSTOLIC BLOOD PRESSURE: 102 MMHG | RESPIRATION RATE: 14 BRPM | TEMPERATURE: 98.5 F | HEIGHT: 62 IN | BODY MASS INDEX: 22.45 KG/M2 | OXYGEN SATURATION: 99 % | HEART RATE: 92 BPM | DIASTOLIC BLOOD PRESSURE: 64 MMHG | WEIGHT: 122 LBS

## 2018-01-12 DIAGNOSIS — J01.00 ACUTE NON-RECURRENT MAXILLARY SINUSITIS: ICD-10-CM

## 2018-01-12 DIAGNOSIS — J02.8 PHARYNGITIS DUE TO OTHER ORGANISM: ICD-10-CM

## 2018-01-12 PROCEDURE — 99214 OFFICE O/P EST MOD 30 MIN: CPT | Performed by: FAMILY MEDICINE

## 2018-01-12 RX ORDER — AMOXICILLIN 500 MG/1
500 TABLET, FILM COATED ORAL
Qty: 30 TAB | Refills: 0 | Status: SHIPPED | OUTPATIENT
Start: 2018-01-12 | End: 2018-01-22

## 2018-01-13 NOTE — PROGRESS NOTES
HPI: Berenice Holden is a 22 y.o. female who presents with   Chief Complaint   Patient presents with   • Other     painful swallowing x2 weeks      Patient has had 2 weeks of ear pressure and pain sore throat and some head congestion. No fevers or chills T complaints have been present. No nausea vomiting or diarrhea she denies feeling dizzy  Worsened by: activity, laying supine at night, first thing in the morning, when exposed to outside allergens  Improved by: OTC symptomatic medictions      PMH:  has a past medical history of Family planning. She also has no past medical history of ASTHMA; CAD (coronary artery disease); Cancer (CMS-HCC); Congestive heart failure (CMS-HCC); COPD; Diabetes; Hypertension; Infectious disease; Liver disease; Psychiatric disorder; Renal disorder; Seizure disorder (CMS-HCC); or Stroke (CMS-HCC).  MEDS:   Current Outpatient Prescriptions:   •  Amoxicillin 500 MG Tab, Take 1 Tab by mouth 3 times a day, with meals for 10 days., Disp: 30 Tab, Rfl: 0  •  Alum & Mag Hydroxide-Simeth (MBX) Suspension, Benadyl/Mylanta or Maalox/Xylocaine (1:1:1:) 5-10cc po gargle and spit or swallow q4-6hrs prn sore throat, Disp: 90 mL, Rfl: 0  •  acyclovir (ZOVIRAX) 400 MG tablet, Take 1 Tab by mouth 3 times a day as needed., Disp: 9 Tab, Rfl: 1  •  vitamin D, Ergocalciferol, (DRISDOL) 10375 UNITS Cap capsule, TAKE 1 CAPSULE BY MOUTH EVERY 7 DAYS., Disp: 12 Cap, Rfl: 0  •  Norgestim-Eth Estrad Triphasic 0.18/0.215/0.25 MG-35 MCG Tab, Take 1 Tab by mouth every day., Disp: 28 Tab, Rfl: 12  •  metronidazole (FLAGYL) 500 MG Tab, Take 1 Tab by mouth 3 times a day., Disp: 30 Tab, Rfl: 0  •  Probiotic Product (PROBIOTIC ACIDOPHILUS) Cap, Take 2 Caps by mouth every day., Disp: 60 Cap, Rfl: 0  ALLERGIES: No Known Allergies  SURGHX: History reviewed. No pertinent surgical history.  SOCHX:  reports that she has never smoked. She has never used smokeless tobacco. She reports that she drinks alcohol. She reports that she  "does not use drugs.  FH: Family history was reviewed, no pertinent findings to report    PE:  Vitals /64   Pulse 92   Temp 36.9 °C (98.5 °F)   Resp 14   Ht 1.575 m (5' 2\")   Wt 55.3 kg (122 lb)   SpO2 99%   BMI 22.31 kg/m²    Gen AOx4, NAD  HEENT: moist mucus membranes, no pain but mild pressure with percussion of bilateral maxillary  sinuses.  Bilateral conjunciva clear without erythema or exudate,  Bilateral TM's with fluid bulge, mild erythema without loss of landmarks, mild pharyngeal erythema without tonsillar exudate but with bilatearl tonsillar enlargement present  Neck: supple, bilateral anterior cervical lymphadenopathy is present, no signs of menigismus  CV/PULM: RRR no murmurs, no rales ronchi or wheezes, no signs of resp distress  Abd soft nontender, bs present  Skin no rashes  Extremities -c/c/e  Neuro appropriate affect,     A/P  1. Acute non-recurrent maxillary sinusitis  Amoxicillin 500 MG Tab    Alum & Mag Hydroxide-Simeth (MBX) Suspension   2. Pharyngitis due to other organism  Amoxicillin 500 MG Tab    Alum & Mag Hydroxide-Simeth (MBX) Suspension     Follow-up with primary care provider within 4-5 days, emergency room precautions discussed.  Patient and/or family appears understanding of information.    "

## 2018-02-11 DIAGNOSIS — Z32.01 PREGNANCY TEST POSITIVE: ICD-10-CM

## 2018-02-26 ENCOUNTER — OFFICE VISIT (OUTPATIENT)
Dept: MEDICAL GROUP | Facility: MEDICAL CENTER | Age: 23
End: 2018-02-26
Payer: COMMERCIAL

## 2018-02-26 VITALS
SYSTOLIC BLOOD PRESSURE: 104 MMHG | BODY MASS INDEX: 21.37 KG/M2 | RESPIRATION RATE: 16 BRPM | WEIGHT: 120.59 LBS | OXYGEN SATURATION: 98 % | HEIGHT: 63 IN | HEART RATE: 89 BPM | TEMPERATURE: 98.6 F | DIASTOLIC BLOOD PRESSURE: 70 MMHG

## 2018-02-26 DIAGNOSIS — H91.93 BILATERAL HEARING LOSS, UNSPECIFIED HEARING LOSS TYPE: ICD-10-CM

## 2018-02-26 DIAGNOSIS — Z30.09 FAMILY PLANNING: ICD-10-CM

## 2018-02-26 DIAGNOSIS — B00.9 HSV INFECTION: ICD-10-CM

## 2018-02-26 DIAGNOSIS — H92.02 LEFT EAR PAIN: ICD-10-CM

## 2018-02-26 PROBLEM — E55.9 VITAMIN D DEFICIENCY DISEASE: Status: RESOLVED | Noted: 2017-06-22 | Resolved: 2018-02-26

## 2018-02-26 PROBLEM — N92.6 IRREGULAR MENSES: Status: RESOLVED | Noted: 2017-06-22 | Resolved: 2018-02-26

## 2018-02-26 PROCEDURE — 99214 OFFICE O/P EST MOD 30 MIN: CPT | Performed by: PHYSICIAN ASSISTANT

## 2018-02-26 RX ORDER — ACYCLOVIR 400 MG/1
400 TABLET ORAL 2 TIMES DAILY
Qty: 60 TAB | Refills: 11 | Status: SHIPPED | OUTPATIENT
Start: 2018-02-26 | End: 2018-06-15 | Stop reason: SDUPTHER

## 2018-02-26 NOTE — PROGRESS NOTES
"Subjective:   Berenice Holden is a 22 y.o. female here today to establish care and discuss hearing loss, nexplanon, HSV infection    Bilateral hearing loss  After ear infection in January. Occasionally ringing in ears. Feels like it has actually been a problem for a few years but worse now. No headache, dizziness, nasal congestion, sinus pain. No known injury to ears.    Family planning  Has trouble remembering to take the pill. Feels like nexplanon would be a good choice. Has been on Depo before, had some hormone problems and weight gain.     HSV infection  Dx one year ago, has been getting multiple outbreaks. Takes acyclovir. Would like to take the daily suppressive dose.       Current medicines (including changes today)  Current Outpatient Prescriptions   Medication Sig Dispense Refill   • acyclovir (ZOVIRAX) 400 MG tablet Take 1 Tab by mouth 2 times a day for 30 days. 60 Tab 11   • Norgestim-Eth Estrad Triphasic 0.18/0.215/0.25 MG-35 MCG Tab Take 1 Tab by mouth every day. 28 Tab 12   • acyclovir (ZOVIRAX) 400 MG tablet Take 1 Tab by mouth 3 times a day as needed. 9 Tab 1     No current facility-administered medications for this visit.      She  has a past medical history of Family planning and HSV infection (2/26/2018). She also has no past medical history of ASTHMA; CAD (coronary artery disease); Cancer (CMS-HCC); Congestive heart failure (CMS-HCC); COPD; Diabetes; Hypertension; Liver disease; Psychiatric disorder; Renal disorder; Seizure disorder (CMS-[facility]); or Stroke (CMS-HCC).    ROS   No fever/chills. No weight change. No headache/dizziness. No focal weakness. No sore throat, nasal congestion, ear pain. No chest pain, no shortness of breath, difficulty breathing. No n/v/d/c or abdominal pain. No urinary complaint. No rash or skin lesion. No joint pain or swelling.       Objective:     Blood pressure 104/70, pulse 89, temperature 37 °C (98.6 °F), resp. rate 16, height 1.6 m (5' 3\"), weight 54.7 kg (120 lb 9.5 " oz), last menstrual period 02/12/2018, SpO2 98 %. Body mass index is 21.36 kg/m².   Physical Exam:  Constitutional: WDWN, NAD  Skin: Warm, dry, good turgor, no rashes in visible areas.  Eye: Equal, round and reactive, conjunctiva clear, lids normal.  ENMT: Lips without lesions, good dentition, oropharynx clear. bilat ear canals clear and TMs with nml appearance  Neck: Trachea midline, no masses, no thyromegaly. No cervical or supraclavicular lymphadenopathy  Respiratory: Unlabored respiratory effort, lungs clear to auscultation, no wheezes, no ronchi.  Cardiovascular: Normal S1, S2, no murmur, no leg edema.  Psych: Alert and oriented x3, normal affect and mood.        Assessment and Plan:   The following treatment plan was discussed       Bilateral hearing loss, unspecified hearing loss type    - REFERRAL TO AUDIOLOGY    Family planning  Continue BCP for now and condoms if she is not remembering to take the pill  - REFERRAL TO GYNECOLOGY    4. HSV infection  Daily suppressive dose discussed.  - acyclovir (ZOVIRAX) 400 MG tablet; Take 1 Tab by mouth 2 times a day for 30 days.  Dispense: 60 Tab; Refill: 11      Followup: prn

## 2018-02-26 NOTE — ASSESSMENT & PLAN NOTE
Has trouble remembering to take the pill. Feels like nexplanon would be a good choice. Has been on Depo before, had some hormone problems and weight gain.

## 2018-02-26 NOTE — ASSESSMENT & PLAN NOTE
After ear infection in January. Occasionally ringing in ears. Feels like it has actually been a problem for a few years but worse now. No headache, dizziness, nasal congestion, sinus pain. No known injury to ears.

## 2018-03-14 NOTE — ASSESSMENT & PLAN NOTE
Addended by: ANTONIO CARRANZA on: 3/14/2018 12:51 PM     Modules accepted: Orders     Dx one year ago, has been getting multiple outbreaks. Takes acyclovir. Would like to take the daily suppressive dose.

## 2018-06-15 ENCOUNTER — TELEPHONE (OUTPATIENT)
Dept: MEDICAL GROUP | Facility: MEDICAL CENTER | Age: 23
End: 2018-06-15

## 2018-06-15 DIAGNOSIS — B00.9 HSV INFECTION: ICD-10-CM

## 2018-06-15 RX ORDER — ACYCLOVIR 400 MG/1
400 TABLET ORAL 2 TIMES DAILY
Qty: 180 TAB | Refills: 3 | Status: SHIPPED | OUTPATIENT
Start: 2018-06-15 | End: 2018-09-13

## 2018-06-15 NOTE — TELEPHONE ENCOUNTER
Pt called requesting if you send her a 3 month supply for acyclovir. She stated her insurance will . Please advise.

## 2019-02-06 ENCOUNTER — APPOINTMENT (OUTPATIENT)
Dept: MEDICAL GROUP | Facility: MEDICAL CENTER | Age: 24
End: 2019-02-06